# Patient Record
Sex: FEMALE | Race: WHITE | Employment: UNEMPLOYED | URBAN - METROPOLITAN AREA
[De-identification: names, ages, dates, MRNs, and addresses within clinical notes are randomized per-mention and may not be internally consistent; named-entity substitution may affect disease eponyms.]

---

## 2020-10-21 PROBLEM — R29.818 SUSPECTED SLEEP APNEA: Status: ACTIVE | Noted: 2020-10-21

## 2020-10-21 PROBLEM — G47.10 HYPERSOMNIA: Status: ACTIVE | Noted: 2020-10-21

## 2020-10-21 PROBLEM — G47.8 NON-RESTORATIVE SLEEP: Status: ACTIVE | Noted: 2020-10-21

## 2021-02-18 ENCOUNTER — HOSPITAL ENCOUNTER (OUTPATIENT)
Dept: SLEEP MEDICINE | Age: 56
Discharge: HOME | End: 2021-02-18

## 2021-02-18 PROCEDURE — 95806 SLEEP STUDY UNATT&RESP EFFT: CPT

## 2021-03-11 PROBLEM — G47.33 OSA (OBSTRUCTIVE SLEEP APNEA): Status: ACTIVE | Noted: 2020-10-21

## 2022-03-18 PROBLEM — G47.33 OSA (OBSTRUCTIVE SLEEP APNEA): Status: ACTIVE | Noted: 2020-10-21

## 2022-03-19 PROBLEM — G47.10 HYPERSOMNIA: Status: ACTIVE | Noted: 2020-10-21

## 2022-03-19 PROBLEM — G47.8 NON-RESTORATIVE SLEEP: Status: ACTIVE | Noted: 2020-10-21

## 2023-01-06 ENCOUNTER — HOSPITAL ENCOUNTER (EMERGENCY)
Dept: GENERAL RADIOLOGY | Age: 58
End: 2023-01-06

## 2023-01-06 ENCOUNTER — APPOINTMENT (OUTPATIENT)
Dept: GENERAL RADIOLOGY | Age: 58
End: 2023-01-06

## 2023-01-06 ENCOUNTER — HOSPITAL ENCOUNTER (EMERGENCY)
Age: 58
Discharge: HOME | End: 2023-01-07
Attending: EMERGENCY MEDICINE

## 2023-01-06 DIAGNOSIS — L03.116 CELLULITIS OF LEFT LOWER EXTREMITY: Primary | ICD-10-CM

## 2023-01-06 LAB
ALBUMIN SERPL-MCNC: 3.1 G/DL (ref 3.2–4.6)
ALBUMIN/GLOB SERPL: 0.7 (ref 0.4–1.6)
ALP SERPL-CCNC: 73 U/L (ref 50–130)
ALT SERPL-CCNC: 31 U/L (ref 12–65)
ANION GAP SERPL CALC-SCNC: 5 MMOL/L (ref 2–11)
AST SERPL-CCNC: 33 U/L (ref 15–37)
BASOPHILS # BLD: 0.1 K/UL (ref 0–0.2)
BASOPHILS NFR BLD: 1 % (ref 0–2)
BILIRUB SERPL-MCNC: 0.3 MG/DL (ref 0.2–1.1)
BUN SERPL-MCNC: 12 MG/DL (ref 8–23)
CALCIUM SERPL-MCNC: 9.6 MG/DL (ref 8.3–10.4)
CHLORIDE SERPL-SCNC: 102 MMOL/L (ref 101–110)
CO2 SERPL-SCNC: 29 MMOL/L (ref 21–32)
CREAT SERPL-MCNC: 0.75 MG/DL (ref 0.6–1)
DIFFERENTIAL METHOD BLD: ABNORMAL
EOSINOPHIL # BLD: 0.2 K/UL (ref 0–0.8)
EOSINOPHIL NFR BLD: 3 % (ref 0.5–7.8)
ERYTHROCYTE [DISTWIDTH] IN BLOOD BY AUTOMATED COUNT: 12.7 % (ref 11.9–14.6)
GLOBULIN SER CALC-MCNC: 4.5 G/DL (ref 2.8–4.5)
GLUCOSE SERPL-MCNC: 137 MG/DL (ref 65–100)
HCT VFR BLD AUTO: 34.8 % (ref 35.8–46.3)
HGB BLD-MCNC: 11.3 G/DL (ref 11.7–15.4)
IMM GRANULOCYTES # BLD AUTO: 0 K/UL (ref 0–0.5)
IMM GRANULOCYTES NFR BLD AUTO: 0 % (ref 0–5)
LACTATE SERPL-SCNC: 1.4 MMOL/L (ref 0.4–2)
LYMPHOCYTES # BLD: 2.6 K/UL (ref 0.5–4.6)
LYMPHOCYTES NFR BLD: 30 % (ref 13–44)
MCH RBC QN AUTO: 28.3 PG (ref 26.1–32.9)
MCHC RBC AUTO-ENTMCNC: 32.5 G/DL (ref 31.4–35)
MCV RBC AUTO: 87.2 FL (ref 82–102)
MONOCYTES # BLD: 0.6 K/UL (ref 0.1–1.3)
MONOCYTES NFR BLD: 7 % (ref 4–12)
NEUTS SEG # BLD: 5.1 K/UL (ref 1.7–8.2)
NEUTS SEG NFR BLD: 59 % (ref 43–78)
NRBC # BLD: 0 K/UL (ref 0–0.2)
PLATELET # BLD AUTO: 387 K/UL (ref 150–450)
PMV BLD AUTO: 8.4 FL (ref 9.4–12.3)
POTASSIUM SERPL-SCNC: 3.4 MMOL/L (ref 3.5–5.1)
PROCALCITONIN SERPL-MCNC: <0.05 NG/ML (ref 0–0.49)
PROT SERPL-MCNC: 7.6 G/DL (ref 6.3–8.2)
RBC # BLD AUTO: 3.99 M/UL (ref 4.05–5.2)
SODIUM SERPL-SCNC: 136 MMOL/L (ref 133–143)
WBC # BLD AUTO: 8.7 K/UL (ref 4.3–11.1)

## 2023-01-06 PROCEDURE — 87040 BLOOD CULTURE FOR BACTERIA: CPT

## 2023-01-06 PROCEDURE — 93005 ELECTROCARDIOGRAM TRACING: CPT | Performed by: EMERGENCY MEDICINE

## 2023-01-06 PROCEDURE — 83605 ASSAY OF LACTIC ACID: CPT

## 2023-01-06 PROCEDURE — 85025 COMPLETE CBC W/AUTO DIFF WBC: CPT

## 2023-01-06 PROCEDURE — 99285 EMERGENCY DEPT VISIT HI MDM: CPT

## 2023-01-06 PROCEDURE — 71045 X-RAY EXAM CHEST 1 VIEW: CPT

## 2023-01-06 PROCEDURE — 80053 COMPREHEN METABOLIC PANEL: CPT

## 2023-01-06 PROCEDURE — 84145 PROCALCITONIN (PCT): CPT

## 2023-01-06 ASSESSMENT — PAIN DESCRIPTION - LOCATION: LOCATION: LEG

## 2023-01-06 ASSESSMENT — PAIN DESCRIPTION - ONSET: ONSET: ON-GOING

## 2023-01-06 ASSESSMENT — PAIN - FUNCTIONAL ASSESSMENT: PAIN_FUNCTIONAL_ASSESSMENT: 0-10

## 2023-01-06 ASSESSMENT — PAIN DESCRIPTION - FREQUENCY: FREQUENCY: CONTINUOUS

## 2023-01-06 ASSESSMENT — LIFESTYLE VARIABLES
HOW MANY STANDARD DRINKS CONTAINING ALCOHOL DO YOU HAVE ON A TYPICAL DAY: PATIENT DOES NOT DRINK
HOW OFTEN DO YOU HAVE A DRINK CONTAINING ALCOHOL: NEVER

## 2023-01-06 ASSESSMENT — PAIN DESCRIPTION - PAIN TYPE: TYPE: ACUTE PAIN

## 2023-01-06 ASSESSMENT — PAIN SCALES - GENERAL: PAINLEVEL_OUTOF10: 10

## 2023-01-06 ASSESSMENT — PAIN DESCRIPTION - ORIENTATION: ORIENTATION: LEFT

## 2023-01-07 ENCOUNTER — HOSPITAL ENCOUNTER (EMERGENCY)
Dept: GENERAL RADIOLOGY | Age: 58
End: 2023-01-07

## 2023-01-07 ENCOUNTER — HOSPITAL ENCOUNTER (EMERGENCY)
Dept: ULTRASOUND IMAGING | Age: 58
End: 2023-01-07

## 2023-01-07 VITALS
RESPIRATION RATE: 20 BRPM | WEIGHT: 108 LBS | HEART RATE: 97 BPM | HEIGHT: 63 IN | SYSTOLIC BLOOD PRESSURE: 111 MMHG | DIASTOLIC BLOOD PRESSURE: 56 MMHG | BODY MASS INDEX: 19.14 KG/M2 | TEMPERATURE: 98.8 F | OXYGEN SATURATION: 98 %

## 2023-01-07 LAB
EKG ATRIAL RATE: 93 BPM
EKG DIAGNOSIS: NORMAL
EKG P AXIS: 72 DEGREES
EKG P-R INTERVAL: 120 MS
EKG Q-T INTERVAL: 354 MS
EKG QRS DURATION: 78 MS
EKG QTC CALCULATION (BAZETT): 440 MS
EKG R AXIS: 50 DEGREES
EKG T AXIS: 56 DEGREES
EKG VENTRICULAR RATE: 93 BPM

## 2023-01-07 PROCEDURE — 6370000000 HC RX 637 (ALT 250 FOR IP): Performed by: EMERGENCY MEDICINE

## 2023-01-07 PROCEDURE — 73590 X-RAY EXAM OF LOWER LEG: CPT

## 2023-01-07 PROCEDURE — 93971 EXTREMITY STUDY: CPT

## 2023-01-07 RX ORDER — AMOXICILLIN AND CLAVULANATE POTASSIUM 875; 125 MG/1; MG/1
1 TABLET, FILM COATED ORAL 2 TIMES DAILY
Qty: 10 TABLET | Refills: 0 | Status: SHIPPED | OUTPATIENT
Start: 2023-01-07 | End: 2023-01-12

## 2023-01-07 RX ORDER — HYDROCODONE BITARTRATE AND ACETAMINOPHEN 7.5; 325 MG/1; MG/1
1 TABLET ORAL
Status: COMPLETED | OUTPATIENT
Start: 2023-01-07 | End: 2023-01-07

## 2023-01-07 RX ADMIN — HYDROCODONE BITARTRATE AND ACETAMINOPHEN 1 TABLET: 7.5; 325 TABLET ORAL at 03:09

## 2023-01-07 ASSESSMENT — ENCOUNTER SYMPTOMS
DIARRHEA: 0
ABDOMINAL PAIN: 0
VOMITING: 0
SHORTNESS OF BREATH: 0
FACIAL SWELLING: 0

## 2023-01-07 ASSESSMENT — PAIN SCALES - GENERAL: PAINLEVEL_OUTOF10: 10

## 2023-01-07 NOTE — ED NOTES
I have reviewed discharge instructions with the patient. The patient verbalized understanding. Patient left ED via Discharge Method: wheelchair to Home with family. Opportunity for questions and clarification provided. Patient given 1 scripts. To continue your aftercare when you leave the hospital, you may receive an automated call from our care team to check in on how you are doing. This is a free service and part of our promise to provide the best care and service to meet your aftercare needs.  If you have questions, or wish to unsubscribe from this service please call [phone]. Thank you for Choosing our [facility] Emergency Department.       David Hernández RN  01/07/23 5437

## 2023-01-07 NOTE — ED PROVIDER NOTES
Emergency Department Provider Note                   PCP:                None None               Age: 59 y.o. Sex: female       ICD-10-CM    1. Cellulitis of left lower extremity  L03. [address] To Discharge 01/07/2023 03:08:24 AM       Medical Decision Making  Wound only mildly erythematous around the wound VAC. No significant cellulitis to require admission. Labs unremarkable. No DVT or signs of osteomyelitis. Advised to follow-up with surgeon as soon as possible for wound check. We will give an additional 5 days of Augmentin. Amount and/or Complexity of Data Reviewed  External Data Reviewed: labs, radiology and notes. Labs: ordered. Radiology: ordered and independent interpretation performed. ECG/medicine tests: ordered and independent interpretation performed. Risk  Prescription drug management. Orders Placed This Encounter   Procedures    Culture, Blood 1    Culture, Blood 2    XR CHEST PORTABLE    XR TIBIA FIBULA LEFT (2 VIEWS)    Lactate, Sepsis    CBC with Auto Differential    CMP    Procalcitonin    Cardiac Monitor - ED Only    Straight Cath (Select if patient is unable to provide a sample)    POCT Urine Dipstick    EKG 12 Lead    Saline lock IV    Vascular duplex lower extremity venous left        Medications   HYDROcodone-acetaminophen (NORCO) 7.5-325 MG per tablet 1 tablet (1 tablet Oral Given 1/7/23 0309)       New Prescriptions    AMOXICILLIN-CLAVULANATE (AUGMENTIN) 875-125 MG PER TABLET    Take 1 tablet by mouth 2 times daily for 5 days        Tevin Marcus is a 59 y.o. female who presents to the Emergency Department with chief complaint of    Chief Complaint   Patient presents with    Post-op Problem      Patient presents with increased swelling to her left leg with redness around her wound VAC from a dog bite requiring 2 debridements. She was admitted to [facility] January 2 through 4.   She has been managed by home health. Home health nurse was concerned about the increased redness and swelling tonight so sent her to the emergency department. Her last dose of Augmentin is today. Also reports subjective fevers. Discharge Summary  Trauma Surgery 2023     Basilio Lebron :1965 [id number] Sharri Crowe MD     Discharge time: 35 minutes    Date of Admission: 23 Date of Discharge: 23    Primary Diagnoses:   Multiple dog bite wounds     Procedures During Admission:  Left lower extremity wound washout, packing 23 Dr. Nikos Samaniego   Left lower extremity debridement, wound vac placement 1/3/23 Dr. Katja Leiva     HPI: per H&P   Ms David Segundo presented to [facility] ED via EMS with complaints of multiple dog bites. Patient states she heard something in yard, and went outside to investigate. She states the neighbor's pitbull attacked her. She states she fell on to her knees and then fell on her buttock. Denies falling and hitting her head or any loc. Pain is located in her lower extremities at sites of bites. Denies any chest pain, shortness of breath, abdominal pain, nausea or vomiting. She denies any bleeding disorders. States she does have many medical problems but does not take any medications regularly or any blood thinners. She states the dogs have been an issue in the past, requiring animal control investigation. She believes the dogs have been vaccinated for Rabies and that the dogs are currently dead. Summary:  Patient taken to the operating room urgently for washout of his extensive bilateral lower extremity wounds. Dr. Nikos Samaniego washed out wounds and packed with kerlix. She was admitted post operatively and taken to the OR the following day for planned repeat washout of the wounds with placement of wound vac to leg leg. Home wound vac has been arranged and she will be discharged with home health nursing for wound vac changes and wet to dry dressing changes to right leg wounds.  Upon arrival, patient was started on Augmentin and she will be discharged to complete 5 day course of augmentin. Patient remains afebrile with stable vital signs. She is stable for discharge home today. Consults:  CONSULT TO CM/SHASHANK FOR DISCHARGE PLANNING  WOUND OSTOMY EVAL AND TREAT  CONSULT TO CM/SHASHANK FOR DISCHARGE PLANNING    Follow Up Appointments:  No future appointments. Trauma office 2 weeks     Prescriptions Provided:  See below     DISCHARGE EXAM     Temperature: 97.2 °F (36.2 °C) [97.2 °F (36.2 °C)-98.5 °F (36.9 °C)]   Heart Rate: 86 [78-90]   Resp Rate: 18 [11-21]   Blood Pressure: 102/67 ()/(56-68)   Oxygen Sats: 92 % [92 %-100 %]     Current Weight: 52.2 kg (115 lb)     Discharge Diet Regular    Physical Exam: performed by Dr. Amanda Ledbetter on day of discharge 1/4/23   CONST No acute distress and Resting comfortably, tearful   NEURO alert & oriented x3   CV Regular rate and rhythm   RESP normal work of breathing , no stridor   GI Soft, Non distended   Incision Wound vac in place, subq exposed on right but healthy appearing     Recent Labs   01/02/23  1245 01/03/23  1032 01/04/23  0505   WBC 14.9* 10.8 8.5   HGB 12.6 10.4* 9.4*   HCT 38.9 31.5* 28.7*    315 288   INR 1.0 -- --   PTT 26 -- --       Recent Labs   01/02/23  1245 01/03/23  1032 01/04/23  0505    -- --   K 3.4* -- --    -- --   CO2 23 -- --   BUN 12 -- --   CREATININE 0.80 -- --   GLUCOSE 148* -- --   CALCIUM 8.8 -- --   MG 2.0 1.8 1.8   PHOS 3.2 2.9 3.2       Discharge Condition: stable    Visit Diagnosis/Procedures    Active Problems:  Dog bite of calf, left, initial encounter  Dog bite of left lower leg           Review of Systems   Constitutional:  Positive for fever. HENT:  Negative for facial swelling. Eyes:  Negative for visual disturbance. Respiratory:  Negative for shortness of breath. Cardiovascular:  Positive for leg swelling. Negative for chest pain.    Gastrointestinal:  Negative for abdominal pain, diarrhea and vomiting. Musculoskeletal:  Positive for arthralgias and myalgias. Negative for joint swelling. Skin:  Positive for wound. Negative for rash. Neurological:  Negative for speech difficulty. Psychiatric/Behavioral:  Negative for confusion. All other systems reviewed and are negative. Past Medical History:   Diagnosis Date    Allergic rhinitis     Arthritis     chronic back pain    Chronic pain     Fibromyalgia, arthritis    Depression     Dyslipidemia     GERD (gastroesophageal reflux disease)     Interstitial cystitis     Sleep disorder     idiopathic hypersomnia        Past Surgical History:   Procedure Laterality Date    BREAST SURGERY  2002    reduction    CHOLECYSTECTOMY, LAPAROSCOPIC  1997    GYN      cryo (froze cervix)    REFRACTIVE SURGERY  2002    SHOULDER ARTHROSCOPY Right 2005    rotator cuff        Family History   Problem Relation Age of Onset    Cancer Father         throat cancer, skin cancer    Cancer Mother         breast    Heart Disease Mother         cardiomyopathy        Social History     Socioeconomic History    Marital status:      Spouse name: None    Number of children: None    Years of education: None    Highest education level: None   Tobacco Use    Smoking status: Every Day     Packs/day: 0.50     Types: Cigarettes    Smokeless tobacco: Never   Substance and Sexual Activity    Alcohol use: No    Drug use: No        Allergies: Sulfa antibiotics and Aspirin    Previous Medications    AMPHETAMINE-DEXTROAMPHETAMINE (ADDERALL, 30MG,) 30 MG TABLET    Take 30 mg by mouth 3 times daily. DIAZEPAM (VALIUM) 5 MG TABLET    Take 5 mg by mouth every 6 hours as needed. ESCITALOPRAM (LEXAPRO) 20 MG TABLET    Take 20 mg by mouth daily    FEXOFENADINE (ALLEGRA ALLERGY) 180 MG TABLET    Take 180 mg by mouth daily    HYDROCODONE-ACETAMINOPHEN (NORCO) 7.5-325 MG PER TABLET    Take 1 tablet by mouth every 8 hours as needed.     LANSOPRAZOLE (PREVACID) 30 MG DELAYED RELEASE CAPSULE    Take 20 mg by mouth every morning (before breakfast)    LEVOCETIRIZINE (XYZAL) 5 MG TABLET    Take 5 mg by mouth daily    OMEPRAZOLE (PRILOSEC OTC) 20 MG TABLET    Take 20 mg by mouth daily    SOLRIAMFETOL HCL (SUNOSI) 75 MG TABS    Take 75 mg by mouth daily    TRAMADOL (ULTRAM) 50 MG TABLET    Take 50 mg by mouth every 6 hours as needed. VITAMIN E 400 UNITS TABS    Take 400 mg by mouth        Vitals signs and nursing note reviewed. No data found. Physical Exam  Vitals and nursing note reviewed. Constitutional:       Appearance: Normal appearance. She is well-developed. HENT:      Head: Normocephalic and atraumatic. Nose: Nose normal.      Mouth/Throat:      Mouth: Mucous membranes are moist.   Eyes:      Extraocular Movements: Extraocular movements intact. Pupils: Pupils are equal, round, and reactive to light. Cardiovascular:      Rate and Rhythm: Regular rhythm. Tachycardia present. Pulmonary:      Effort: Pulmonary effort is normal. No respiratory distress. Abdominal:      General: Abdomen is flat. There is no distension. Musculoskeletal:         General: Swelling, tenderness and signs of injury present. Normal range of motion. Cervical back: Neck supple. Skin:     General: Skin is warm and dry. Findings: Laceration present. Comments: Multiple superficial lacerations with wound VAC covering left calf. Slight surrounding erythematous streaks. Edema right leg extending into foot with palpable pulses   Neurological:      General: No focal deficit present. Mental Status: She is alert. Mental status is at baseline.    Psychiatric:         Mood and Affect: Mood normal.              Procedures    ED EKG Interpretation  EKG was interpreted in the absence of a cardiologist.    Rate: 93  EKG Interpretation: EKG Interpretation: sinus rhythm  ST Segments: Nonspecific ST segments - NO STEMI    Results for orders placed or performed during the hospital encounter of 01/06/23   XR CHEST PORTABLE    Narrative    Portable chest xray      COMPARISON: April 2009    INDICATION: Sepsis    FINDINGS:     There is no focal pulmonary consolidation, pleural effusion or pneumothorax. No  pulmonary edema. Cardiac mediastinal silhouette is within normal limits. Surrounding bones are intact. Impression    1. No radiographic evidence of pneumonia or pulmonary edema. XR TIBIA FIBULA LEFT (2 VIEWS)    Narrative    Clinical history: Increased swelling. Dog bite and debridement with wound VAC    TECHNIQUE: 2 views of the left tibia/fibular. FINDINGS:    Tibia and fibula are intact. Alignment is maintained. There is no acute fracture  or dislocation. No erosive or destructive bone lesion. A wound vac device identified. Impression    1. No acute bone abnormality or radiographic evidence of osteomyelitis.    Lactate, Sepsis   Result Value Ref Range    Lactic Acid, Sepsis 1.4 0.4 - 2.0 MMOL/L   CBC with Auto Differential   Result Value Ref Range    WBC 8.7 4.3 - 11.1 K/uL    RBC 3.99 (L) 4.05 - 5.2 M/uL    Hemoglobin 11.3 (L) 11.7 - 15.4 g/dL    Hematocrit 34.8 (L) 35.8 - 46.3 %    MCV 87.2 82.0 - 102.0 FL    MCH 28.3 26.1 - 32.9 PG    MCHC 32.5 31.4 - 35.0 g/dL    RDW 12.7 11.9 - 14.6 %    Platelets 966 621 - 084 K/uL    MPV 8.4 (L) 9.4 - 12.3 FL    nRBC 0.00 0.0 - 0.2 K/uL    Differential Type AUTOMATED      Seg Neutrophils 59 43 - 78 %    Lymphocytes 30 13 - 44 %    Monocytes 7 4.0 - 12.0 %    Eosinophils % 3 0.5 - 7.8 %    Basophils 1 0.0 - 2.0 %    Immature Granulocytes 0 0.0 - 5.0 %    Segs Absolute 5.1 1.7 - 8.2 K/UL    Absolute Lymph # 2.6 0.5 - 4.6 K/UL    Absolute Mono # 0.6 0.1 - 1.3 K/UL    Absolute Eos # 0.2 0.0 - 0.8 K/UL    Basophils Absolute 0.1 0.0 - 0.2 K/UL    Absolute Immature Granulocyte 0.0 0.0 - 0.5 K/UL   CMP   Result Value Ref Range    Sodium 136 133 - 143 mmol/L    Potassium 3.4 (L) 3.5 - 5.1 mmol/L    Chloride 102 101 - 110 mmol/L CO2 29 21 - 32 mmol/L    Anion Gap 5 2 - 11 mmol/L    Glucose 137 (H) 65 - 100 mg/dL    BUN 12 8 - 23 MG/DL    Creatinine 0.75 0.6 - 1.0 MG/DL    Est, Glom Filt Rate >60 >60 ml/min/1.73m2    Calcium 9.6 8.3 - 10.4 MG/DL    Total Bilirubin 0.3 0.2 - 1.1 MG/DL    ALT 31 12 - 65 U/L    AST 33 15 - 37 U/L    Alk Phosphatase 73 50 - 130 U/L    Total Protein 7.6 6.3 - 8.2 g/dL    Albumin 3.1 (L) 3.2 - 4.6 g/dL    Globulin 4.5 2.8 - 4.5 g/dL    Albumin/Globulin Ratio 0.7 0.4 - 1.6     Procalcitonin   Result Value Ref Range    Procalcitonin <0.05 0.00 - 0.49 ng/mL   EKG 12 Lead   Result Value Ref Range    Ventricular Rate 93 BPM    Atrial Rate 93 BPM    P-R Interval 120 ms    QRS Duration 78 ms    Q-T Interval 354 ms    QTc Calculation (Bazett) 440 ms    P Axis 72 degrees    R Axis 50 degrees    T Axis 56 degrees    Diagnosis Normal sinus rhythm    Vascular duplex lower extremity venous left    Narrative    Clinical history: Leg pain, post surgery. TECHNIQUE: Grayscale and color Doppler venous ultrasound of the left lower  extremity    FINDINGS:    The common femoral, femoral, popliteal, posterior tibial and peroneal veins are  patent and compressible. There is color Doppler flow. No evidence of DVT. Impression    1. No evidence of deep vein thrombosis. Vascular duplex lower extremity venous left   Final Result      1. No evidence of deep vein thrombosis. XR TIBIA FIBULA LEFT (2 VIEWS)   Final Result      1. No acute bone abnormality or radiographic evidence of osteomyelitis. XR CHEST PORTABLE   Final Result      1. No radiographic evidence of pneumonia or pulmonary edema. Voice dictation software was used during the making of this note. This software is not perfect and grammatical and other typographical errors may be present. This note has not been completely proofread for errors.      Arlette Phillips MD  01/07/23 2001

## 2023-01-07 NOTE — DISCHARGE INSTRUCTIONS
Follow-up with your surgeon as soon as possible for wound check. Take additional 5 days of Augmentin. Return for worsening or concerning symptoms.

## 2023-01-07 NOTE — ED TRIAGE NOTES
Pt arrives A&ox4 in wheelchair. Pt states she was attacked by pitbulls Monday and had 2 surgeries done. Pt recommended by home health nurse to be seen in ER for redness around wound site. Pulses present distal to site. Swelling noted in foot. Pt states she is having hematuria.

## 2023-01-08 LAB
BACTERIA SPEC CULT: NORMAL
SERVICE CMNT-IMP: NORMAL

## 2023-01-11 LAB
BACTERIA SPEC CULT: NORMAL
SERVICE CMNT-IMP: NORMAL

## 2024-06-14 ENCOUNTER — TELEPHONE (OUTPATIENT)
Dept: ORTHOPEDIC SURGERY | Age: 59
End: 2024-06-14

## 2024-06-14 NOTE — TELEPHONE ENCOUNTER
Pt wants 2nd opinion of right shoulder. Pt will send records for Dr. Wheat to review. Pt would be self pay

## 2025-01-07 ENCOUNTER — TELEPHONE (OUTPATIENT)
Dept: ORTHOPEDIC SURGERY | Age: 60
End: 2025-01-07

## 2025-01-07 NOTE — TELEPHONE ENCOUNTER
I  went  ahead and scheduled this pt an appt    She  had  a right  shoulder sx in 2007  w/ Posta and  now has  been  told that she  needs  a  total shoulder and is requesting to see [facility]  again   She  saw  Dr Leiva  2 x in  2024 and not  since  April  no new  films.  I  went  ahead and  made her an appt.    Is  this ok?  She  was  trying to have those records  sent to  us  since  June and they have  never made it.

## 2025-02-05 ENCOUNTER — OFFICE VISIT (OUTPATIENT)
Dept: ORTHOPEDIC SURGERY | Age: 60
End: 2025-02-05
Payer: COMMERCIAL

## 2025-02-05 VITALS — WEIGHT: 108 LBS | HEIGHT: 63 IN | BODY MASS INDEX: 19.14 KG/M2

## 2025-02-05 DIAGNOSIS — M75.101 ROTATOR CUFF TEAR ARTHROPATHY OF RIGHT SHOULDER: Primary | ICD-10-CM

## 2025-02-05 DIAGNOSIS — M12.811 ROTATOR CUFF TEAR ARTHROPATHY OF RIGHT SHOULDER: Primary | ICD-10-CM

## 2025-02-05 DIAGNOSIS — M19.011 DEGENERATIVE JOINT DISEASE OF RIGHT ACROMIOCLAVICULAR JOINT: ICD-10-CM

## 2025-02-05 DIAGNOSIS — M75.21 BICIPITAL TENDINITIS OF RIGHT SHOULDER: ICD-10-CM

## 2025-02-05 PROCEDURE — 99205 OFFICE O/P NEW HI 60 MIN: CPT | Performed by: ORTHOPAEDIC SURGERY

## 2025-02-05 RX ORDER — MELOXICAM 15 MG/1
15 TABLET ORAL DAILY
Qty: 30 TABLET | Refills: 0 | Status: SHIPPED | OUTPATIENT
Start: 2025-02-05 | End: 2025-03-07

## 2025-02-05 RX ORDER — PREDNISONE 10 MG/1
TABLET ORAL
COMMUNITY
Start: 2024-02-12

## 2025-02-05 RX ORDER — IBUPROFEN 600 MG/1
600 TABLET, FILM COATED ORAL EVERY 6 HOURS PRN
COMMUNITY

## 2025-02-05 RX ORDER — MELOXICAM 15 MG/1
15 TABLET ORAL DAILY
COMMUNITY
Start: 2024-04-09

## 2025-02-05 NOTE — PROGRESS NOTES
Name: Gracie Farrell  YOB: 1965  Gender: female  MRN: [id number]      What: Right shoulder pain  How: Complicated history but related to a dog attack 2023    Self-referred second opinion    HPI: Gracie Farrell is a 60 y.o. right-hand-dominant female seen for a self-referred second opinion for right shoulder problems.  She has a history of hypercholesterolemia, nicotine addiction, cervical spondylosis.  Apparently I performed an arthroscopic rotator cuff repair on her right shoulder in 2007.  She did well until 2023 when she was attacked by 2 of her neighbors pit bulls.  She suffered multiple dog bites.  This aggravated her right shoulder.  She was treated at [facility] because she was uninsured at the time.  She has had multiple right shoulder injections.  The injections do not work.  She complains of right shoulder pain difficulty elevating her right arm and inability to go behind the back and difficulty sleeping.  She does have tingling into both hands.  She was followed at the [facility] clinic.  Initially they wanted her to stop smoking for 2 weeks and then 4 months.  She is getting frustrated      ROS/Meds/PSH/PMH/FH/SH: A ten system review of systems was performed and is negative other than what is in the HPI.   Tobacco:  reports that she has been smoking cigarettes. She started smoking about 51 years ago. She has a 25.5 pack-year smoking history. She has never used smokeless tobacco.  Ht 1.6 m (5' 3\")   Wt 49 kg (108 lb)   BMI 19.13 kg/m²      Physical Examination:  She is an awake alert pleasant female ambulating without difficulty  She has restricted range of cervical spine motion without radicular findings    The left shoulder has 0 to 180 degrees of active and 0 to 180 degrees passive forward elevation.   Internal rotation is to T6.  External rotation is to 60 degrees at the side.   In the 90 degree abducted position 90 degrees of external and 90 degrees internal

## 2025-03-06 ENCOUNTER — TELEPHONE (OUTPATIENT)
Dept: ORTHOPEDIC SURGERY | Age: 60
End: 2025-03-06

## 2025-03-06 ENCOUNTER — HOSPITAL ENCOUNTER (OUTPATIENT)
Dept: SURGERY | Age: 60
Discharge: HOME | End: 2025-03-09
Payer: COMMERCIAL

## 2025-03-06 VITALS
HEIGHT: 63 IN | RESPIRATION RATE: 16 BRPM | WEIGHT: 106.6 LBS | DIASTOLIC BLOOD PRESSURE: 82 MMHG | OXYGEN SATURATION: 96 % | TEMPERATURE: 98.1 F | SYSTOLIC BLOOD PRESSURE: 127 MMHG | BODY MASS INDEX: 18.89 KG/M2 | HEART RATE: 85 BPM

## 2025-03-06 LAB
ALBUMIN SERPL-MCNC: 3.4 G/DL (ref 3.2–4.6)
ALBUMIN/GLOB SERPL: 0.8 (ref 1–1.9)
ALP SERPL-CCNC: 85 U/L (ref 35–104)
ALT SERPL-CCNC: <5 U/L (ref 8–45)
ANION GAP SERPL CALC-SCNC: 13 MMOL/L (ref 7–16)
APPEARANCE UR: CLEAR
APTT PPP: 26.5 SEC (ref 23.3–37.4)
AST SERPL-CCNC: 14 U/L (ref 15–37)
BACTERIA URNS QL MICRO: NEGATIVE /HPF
BILIRUB SERPL-MCNC: 0.3 MG/DL (ref 0–1.2)
BILIRUB UR QL: NEGATIVE
BUN SERPL-MCNC: 11 MG/DL (ref 8–23)
CALCIUM SERPL-MCNC: 9.5 MG/DL (ref 8.8–10.2)
CHLORIDE SERPL-SCNC: 99 MMOL/L (ref 98–107)
CO2 SERPL-SCNC: 26 MMOL/L (ref 20–29)
COLOR UR: ABNORMAL
CREAT SERPL-MCNC: 0.75 MG/DL (ref 0.6–1.1)
EPI CELLS #/AREA URNS HPF: ABNORMAL /HPF
ERYTHROCYTE [DISTWIDTH] IN BLOOD BY AUTOMATED COUNT: 13.7 % (ref 11.9–14.6)
GLOBULIN SER CALC-MCNC: 4.2 G/DL (ref 2.3–3.5)
GLUCOSE SERPL-MCNC: 79 MG/DL (ref 70–99)
GLUCOSE UR STRIP.AUTO-MCNC: NEGATIVE MG/DL
HCT VFR BLD AUTO: 41 % (ref 35.8–46.3)
HGB BLD-MCNC: 13.7 G/DL (ref 11.7–15.4)
HGB UR QL STRIP: NEGATIVE
HYALINE CASTS URNS QL MICRO: ABNORMAL /LPF
INR PPP: 1
KETONES UR QL STRIP.AUTO: ABNORMAL MG/DL
LEUKOCYTE ESTERASE UR QL STRIP.AUTO: NEGATIVE
MAGNESIUM SERPL-MCNC: 2 MG/DL (ref 1.8–2.4)
MCH RBC QN AUTO: 27.7 PG (ref 26.1–32.9)
MCHC RBC AUTO-ENTMCNC: 33.4 G/DL (ref 31.4–35)
MCV RBC AUTO: 82.8 FL (ref 82–102)
NITRITE UR QL STRIP.AUTO: NEGATIVE
NRBC # BLD: 0 K/UL (ref 0–0.2)
PH UR STRIP: 5.5 (ref 5–9)
PLATELET # BLD AUTO: 298 K/UL (ref 150–450)
PMV BLD AUTO: 8.4 FL (ref 9.4–12.3)
POTASSIUM SERPL-SCNC: 3.9 MMOL/L (ref 3.5–5.1)
PROT SERPL-MCNC: 7.6 G/DL (ref 6.3–8.2)
PROT UR STRIP-MCNC: 30 MG/DL
PROTHROMBIN TIME: 13.5 SEC (ref 11.3–14.9)
RBC # BLD AUTO: 4.95 M/UL (ref 4.05–5.2)
RBC #/AREA URNS HPF: ABNORMAL /HPF
SODIUM SERPL-SCNC: 138 MMOL/L (ref 136–145)
SP GR UR REFRACTOMETRY: 1.02 (ref 1–1.02)
UROBILINOGEN UR QL STRIP.AUTO: 0.2 EU/DL (ref 0.2–1)
WBC # BLD AUTO: 6 K/UL (ref 4.3–11.1)
WBC URNS QL MICRO: ABNORMAL /HPF

## 2025-03-06 PROCEDURE — 87641 MR-STAPH DNA AMP PROBE: CPT

## 2025-03-06 PROCEDURE — 80053 COMPREHEN METABOLIC PANEL: CPT

## 2025-03-06 PROCEDURE — 85027 COMPLETE CBC AUTOMATED: CPT

## 2025-03-06 PROCEDURE — 83735 ASSAY OF MAGNESIUM: CPT

## 2025-03-06 PROCEDURE — 85730 THROMBOPLASTIN TIME PARTIAL: CPT

## 2025-03-06 PROCEDURE — 81001 URINALYSIS AUTO W/SCOPE: CPT

## 2025-03-06 PROCEDURE — 85610 PROTHROMBIN TIME: CPT

## 2025-03-06 RX ORDER — MELOXICAM 15 MG/1
15 TABLET ORAL DAILY
Qty: 30 TABLET | Refills: 0 | Status: SHIPPED | OUTPATIENT
Start: 2025-03-06 | End: 2025-04-05

## 2025-03-06 ASSESSMENT — PAIN SCALES - GENERAL: PAINLEVEL_OUTOF10: 5

## 2025-03-06 ASSESSMENT — PAIN DESCRIPTION - ORIENTATION: ORIENTATION: RIGHT

## 2025-03-06 ASSESSMENT — PAIN - FUNCTIONAL ASSESSMENT: PAIN_FUNCTIONAL_ASSESSMENT: PREVENTS OR INTERFERES SOME ACTIVE ACTIVITIES AND ADLS

## 2025-03-06 ASSESSMENT — PAIN DESCRIPTION - DESCRIPTORS: DESCRIPTORS: ACHING

## 2025-03-06 ASSESSMENT — PAIN DESCRIPTION - LOCATION: LOCATION: SHOULDER

## 2025-03-06 NOTE — PERIOP NOTE
PLEASE CONTINUE TAKING ALL PRESCRIPTION MEDICATIONS UP TO THE DAY OF SURGERY UNLESS OTHERWISE DIRECTED BELOW.    DISCONTINUE all vitamins, herbals and supplements 1 week prior to surgery. DISCONTINUE Non-Steroidal Anti-Inflammatory (NSAIDS) such as Advil, Ibuprofen, Motrin, Naproxen and Aleve 5 days prior to surgery.       Home Medications to take  the day of surgery    Omeprazole, hydrocodone if needed            Home Medications to Hold- please continue all other medications except these.    Stop meloxicam five days prior to surgery    Stop vitamin E one week prior to surgery      Comments        Bring Dynahex wash and Incentive Spirometer with you to hospital on the day of surgery.            Please do not bring home medications with you on the day of surgery unless otherwise directed by your nurse.  If you are instructed to bring home medications, please give them to your nurse as they will be administered by the nursing staff.    If you have any questions, please call [facility] [phone] option [phone].    A copy of this note was provided to the patient for reference.

## 2025-03-06 NOTE — PERIOP NOTE
Patient verified name and .    Order for consent was not found in EHR.    Type 3 surgery, PAT walk in assessment complete.    Labs per surgeon: no orders received.   Labs per anesthesia protocol: CBC,CMP, PT, PTT, UA, Mag; results pending.  EKG: none needed per anesthesia protocol.    MRSA/MSSA swab collected per policy. MD to consult pharmacy to dose Vanc if appropriate.     Hospital approved surgical skin cleanser and instructions to return bottle on DOS given per hospital policy.    Patient provided with handouts including Guide to Surgery, Pain Management, Preventing Surgical Site Infections, and [facility] Brochure.    Patient answered medical/surgical history questions at their best of ability. All prior to admission medications documented in Epic. Original medication prescription bottle was not visualized during patient appointment.     Patient instructed to hold all vitamins 3 weeks prior to surgery and NSAIDS 5 days prior to surgery.     Patient teach back successful and patient demonstrates knowledge of instruction.

## 2025-03-06 NOTE — TELEPHONE ENCOUNTER
She is having surgery on March 20. She is out of Meloxicam. Can you send in a refill to [facility] on [address].  Will she need to stop this before surgery?

## 2025-03-06 NOTE — TELEPHONE ENCOUNTER
Called and spoke to pt to inform her that [facility] has agreed to refill the Meloxicam and will send it to her pharmacy. Pt also stated she was concerned about post-op recovery. Discussed concerns with the pt. Pt voiced understanding.

## 2025-03-07 LAB
MRSA DNA SPEC QL NAA+PROBE: NOT DETECTED
S AUREUS CPE NOSE QL NAA+PROBE: DETECTED

## 2025-03-15 PROBLEM — M75.21 BICIPITAL TENDINITIS OF RIGHT SHOULDER: Status: ACTIVE | Noted: 2025-03-15

## 2025-03-15 NOTE — H&P
Subjective:     Patient is a 60 y.o. RHD FEMALE WITH RIGHT SHOULDER PAIN.    SEE OFFICE NOTE.    Patient Active Problem List    Diagnosis Date Noted    Rotator cuff tear arthropathy of right shoulder 02/05/2025    Bicipital tendinitis of right shoulder 03/15/2025    Depression     GERD (gastroesophageal reflux disease)     Interstitial cystitis     Arthritis     Dyslipidemia     Chronic pain     Allergic rhinitis     MARIPOSA (obstructive sleep apnea) 10/21/2020    Hypersomnia 10/21/2020    Non-restorative sleep 10/21/2020     Past Medical History:   Diagnosis Date    Allergic rhinitis     Arthritis     chronic back pain    Chronic pain     Fibromyalgia, arthritis    Cryoglobulinemia 2008    Depression     Hx of    Dog bite 01/2023    several surgeries    Dyslipidemia     No meds    GERD (gastroesophageal reflux disease)     Omeprazole daily    History of stomach ulcers     Interstitial cystitis     Mild sleep apnea     does not use c-pap    Palpitations     PONV (postoperative nausea and vomiting)     Raynaud disease     Sleep disorder     idiopathic hypersomnia      Past Surgical History:   Procedure Laterality Date    BREAST SURGERY  2002    reduction    CHOLECYSTECTOMY, LAPAROSCOPIC  1997    GYN      cryo (froze cervix)    LEG DEBRIDEMENT Left     x2 1/2/23, 1/3/23 after dog bites    REFRACTIVE SURGERY  2002    SHOULDER ARTHROSCOPY Right 2005    rotator cuff      Prior to Admission medications    Medication Sig Start Date End Date Taking? Authorizing Provider   meloxicam (MOBIC) 15 MG tablet Take 1 tablet by mouth daily 3/6/25 4/5/25  Mckinley Wheat Jr., MD   meloxicam (MOBIC) 15 MG tablet Take 1 tablet by mouth daily 2/5/25 3/7/25  Mckinley Wheat Jr., MD   HYDROcodone-acetaminophen (NORCO) 7.5-325 MG per tablet Take 1 tablet by mouth every 8 hours as needed. 3/31/17   Automatic Reconciliation, Ar   omeprazole (PRILOSEC OTC) 20 MG tablet Take 1 tablet by mouth daily 11/29/17   Automatic Reconciliation, Ar

## 2025-03-19 ENCOUNTER — ANESTHESIA EVENT (OUTPATIENT)
Dept: SURGERY | Age: 60
End: 2025-03-19
Payer: COMMERCIAL

## 2025-03-19 ENCOUNTER — PREP FOR PROCEDURE (OUTPATIENT)
Dept: ORTHOPEDIC SURGERY | Age: 60
End: 2025-03-19

## 2025-03-19 DIAGNOSIS — M75.101 ROTATOR CUFF TEAR ARTHROPATHY OF RIGHT SHOULDER: Primary | ICD-10-CM

## 2025-03-19 DIAGNOSIS — M12.811 ROTATOR CUFF TEAR ARTHROPATHY OF RIGHT SHOULDER: Primary | ICD-10-CM

## 2025-03-19 RX ORDER — SODIUM CHLORIDE 0.9 % (FLUSH) 0.9 %
5-40 SYRINGE (ML) INJECTION EVERY 12 HOURS SCHEDULED
Status: CANCELLED | OUTPATIENT
Start: 2025-03-19

## 2025-03-19 RX ORDER — SODIUM CHLORIDE 0.9 % (FLUSH) 0.9 %
5-40 SYRINGE (ML) INJECTION PRN
Status: CANCELLED | OUTPATIENT
Start: 2025-03-19

## 2025-03-19 RX ORDER — PROMETHAZINE HYDROCHLORIDE 25 MG/1
25 TABLET ORAL EVERY 6 HOURS PRN
Qty: 20 TABLET | Refills: 0 | Status: SHIPPED | OUTPATIENT
Start: 2025-03-19

## 2025-03-19 RX ORDER — OXYCODONE HYDROCHLORIDE 10 MG/1
10 TABLET ORAL EVERY 6 HOURS PRN
Qty: 28 TABLET | Refills: 0 | Status: ON HOLD | OUTPATIENT
Start: 2025-03-19 | End: 2025-03-21 | Stop reason: HOSPADM

## 2025-03-19 RX ORDER — SODIUM CHLORIDE 9 MG/ML
INJECTION, SOLUTION INTRAVENOUS PRN
Status: CANCELLED | OUTPATIENT
Start: 2025-03-19

## 2025-03-19 RX ORDER — KETOROLAC TROMETHAMINE 10 MG/1
TABLET, FILM COATED ORAL
Qty: 20 TABLET | Refills: 0 | Status: SHIPPED | OUTPATIENT
Start: 2025-03-19

## 2025-03-19 NOTE — DISCHARGE SUMMARY
[facility] Discharge Summary      Patient ID:  Gracie Farrell  [id number]  60 y.o.  1965    Allergies: Bee venom, Sulfa antibiotics, and Aspirin    Admit date: 3/20/2025    Discharge date and time: 3/21/2025    Admitting Physician: Mckinley Wheat Jr., MD     Discharge Physician: MCKINLEY WHEAT JR, MD      * Admission Diagnoses: Rotator cuff tear arthropathy of right shoulder [M75.101, M12.811]    * Discharge Diagnoses: Principal Problem:    Rotator cuff tear arthropathy of right shoulder  Active Problems:    Bicipital tendinitis of right shoulder  Resolved Problems:    * No resolved hospital problems. *      Surgeon: MCKINLEY WHEAT JR, MD          * Procedure: Procedure(s) with comments:  right reverse total shoulder arthroplasty with a delta xtend prosthesis and biceps tenodesisgeneral/interscalene. 23hr - interscalene           Perioperative Antibiotics: Ancef  _x__                                                Vancomycin  ___          Post Op complications: none        * Discharge Condition: Good  Wound appears to be healing without any evidence of infection.         * Discharged to: Home    * Follow-up Care/Discharge instructions:  - Resume pre hospital diet            - Resume home medications per medical continuation form     CONTINUE PHYSICAL THERAPY  Sling right shoulder  - Follow up in office as scheduled       Signed:  MCKINLEY WHEAT JR, MD  3/21/2025  6:52 AM

## 2025-03-20 ENCOUNTER — ANESTHESIA (OUTPATIENT)
Dept: SURGERY | Age: 60
End: 2025-03-20
Payer: COMMERCIAL

## 2025-03-20 ENCOUNTER — HOSPITAL ENCOUNTER (OUTPATIENT)
Age: 60
Setting detail: OBSERVATION
Discharge: HOME | End: 2025-03-21
Attending: ORTHOPAEDIC SURGERY | Admitting: ORTHOPAEDIC SURGERY
Payer: COMMERCIAL

## 2025-03-20 ENCOUNTER — APPOINTMENT (OUTPATIENT)
Dept: GENERAL RADIOLOGY | Age: 60
End: 2025-03-20
Attending: ORTHOPAEDIC SURGERY
Payer: COMMERCIAL

## 2025-03-20 DIAGNOSIS — M12.811 ROTATOR CUFF TEAR ARTHROPATHY OF RIGHT SHOULDER: ICD-10-CM

## 2025-03-20 DIAGNOSIS — M75.101 ROTATOR CUFF TEAR ARTHROPATHY OF RIGHT SHOULDER: ICD-10-CM

## 2025-03-20 LAB
25(OH)D3 SERPL-MCNC: 16.6 NG/ML (ref 30–100)
ABO + RH BLD: NORMAL
BLOOD GROUP ANTIBODIES SERPL: NORMAL
EST. AVERAGE GLUCOSE BLD GHB EST-MCNC: 99 MG/DL
GLUCOSE BLD STRIP.AUTO-MCNC: 99 MG/DL (ref 65–100)
HBA1C MFR BLD: 5.1 % (ref 0–5.6)
SERVICE CMNT-IMP: NORMAL
SPECIMEN EXP DATE BLD: NORMAL

## 2025-03-20 PROCEDURE — 3600000005 HC SURGERY LEVEL 5 BASE: Performed by: ORTHOPAEDIC SURGERY

## 2025-03-20 PROCEDURE — C1713 ANCHOR/SCREW BN/BN,TIS/BN: HCPCS | Performed by: ORTHOPAEDIC SURGERY

## 2025-03-20 PROCEDURE — 6370000000 HC RX 637 (ALT 250 FOR IP): Performed by: ORTHOPAEDIC SURGERY

## 2025-03-20 PROCEDURE — 2580000003 HC RX 258: Performed by: ANESTHESIOLOGY

## 2025-03-20 PROCEDURE — 2500000003 HC RX 250 WO HCPCS: Performed by: NURSE ANESTHETIST, CERTIFIED REGISTERED

## 2025-03-20 PROCEDURE — 6360000002 HC RX W HCPCS: Performed by: ANESTHESIOLOGY

## 2025-03-20 PROCEDURE — 86901 BLOOD TYPING SEROLOGIC RH(D): CPT

## 2025-03-20 PROCEDURE — 6360000002 HC RX W HCPCS: Performed by: ORTHOPAEDIC SURGERY

## 2025-03-20 PROCEDURE — 7100000001 HC PACU RECOVERY - ADDTL 15 MIN: Performed by: ORTHOPAEDIC SURGERY

## 2025-03-20 PROCEDURE — 6360000002 HC RX W HCPCS: Performed by: NURSE ANESTHETIST, CERTIFIED REGISTERED

## 2025-03-20 PROCEDURE — 2700000000 HC OXYGEN THERAPY PER DAY

## 2025-03-20 PROCEDURE — 2500000003 HC RX 250 WO HCPCS: Performed by: ORTHOPAEDIC SURGERY

## 2025-03-20 PROCEDURE — 6370000000 HC RX 637 (ALT 250 FOR IP): Performed by: ANESTHESIOLOGY

## 2025-03-20 PROCEDURE — 2709999900 HC NON-CHARGEABLE SUPPLY: Performed by: ORTHOPAEDIC SURGERY

## 2025-03-20 PROCEDURE — 83036 HEMOGLOBIN GLYCOSYLATED A1C: CPT

## 2025-03-20 PROCEDURE — 64415 NJX AA&/STRD BRCH PLXS IMG: CPT | Performed by: ANESTHESIOLOGY

## 2025-03-20 PROCEDURE — 23472 RECONSTRUCT SHOULDER JOINT: CPT | Performed by: ORTHOPAEDIC SURGERY

## 2025-03-20 PROCEDURE — 73030 X-RAY EXAM OF SHOULDER: CPT

## 2025-03-20 PROCEDURE — 7100000000 HC PACU RECOVERY - FIRST 15 MIN: Performed by: ORTHOPAEDIC SURGERY

## 2025-03-20 PROCEDURE — 23430 REPAIR BICEPS TENDON: CPT | Performed by: ORTHOPAEDIC SURGERY

## 2025-03-20 PROCEDURE — 2720000010 HC SURG SUPPLY STERILE: Performed by: ORTHOPAEDIC SURGERY

## 2025-03-20 PROCEDURE — 82306 VITAMIN D 25 HYDROXY: CPT

## 2025-03-20 PROCEDURE — 86900 BLOOD TYPING SEROLOGIC ABO: CPT

## 2025-03-20 PROCEDURE — C1776 JOINT DEVICE (IMPLANTABLE): HCPCS | Performed by: ORTHOPAEDIC SURGERY

## 2025-03-20 PROCEDURE — 3700000000 HC ANESTHESIA ATTENDED CARE: Performed by: ORTHOPAEDIC SURGERY

## 2025-03-20 PROCEDURE — 82962 GLUCOSE BLOOD TEST: CPT

## 2025-03-20 PROCEDURE — G0378 HOSPITAL OBSERVATION PER HR: HCPCS

## 2025-03-20 PROCEDURE — 3700000001 HC ADD 15 MINUTES (ANESTHESIA): Performed by: ORTHOPAEDIC SURGERY

## 2025-03-20 PROCEDURE — 94761 N-INVAS EAR/PLS OXIMETRY MLT: CPT

## 2025-03-20 PROCEDURE — 3600000015 HC SURGERY LEVEL 5 ADDTL 15MIN: Performed by: ORTHOPAEDIC SURGERY

## 2025-03-20 PROCEDURE — 86850 RBC ANTIBODY SCREEN: CPT

## 2025-03-20 DEVICE — PLUG, CEMENT SZ. 10.0
Type: IMPLANTABLE DEVICE | Site: SHOULDER | Status: FUNCTIONAL
Brand: DJO SURGICAL

## 2025-03-20 DEVICE — DELTA XTEND STANDARD HUMERAL PE CUP DIA38 /+6MM STD
Type: IMPLANTABLE DEVICE | Site: SHOULDER | Status: FUNCTIONAL
Brand: DELTA XTEND

## 2025-03-20 DEVICE — DELTA XTEND LATERALIZED GLENOSPHERE +6MM ECCENTRIC 038MM
Type: IMPLANTABLE DEVICE | Site: SHOULDER | Status: FUNCTIONAL
Brand: DELTA XTEND

## 2025-03-20 DEVICE — SCREW BNE L14MM DIA4.5MM PROX CORT TIB S STL ST LOK FULL: Type: IMPLANTABLE DEVICE | Site: SHOULDER | Status: FUNCTIONAL

## 2025-03-20 DEVICE — TOBRA FULL DOSE ANTIBIOTIC BONE CEMENT, 10 PACK CATALOG NUMBER IS 6197-9-010
Type: IMPLANTABLE DEVICE | Site: SHOULDER | Status: FUNCTIONAL
Brand: SIMPLEX

## 2025-03-20 DEVICE — DELTA XTEND MONOBLOC HUM CEMENTED EPIPHYSIS SZ1 /DIA8 STD
Type: IMPLANTABLE DEVICE | Site: SHOULDER | Status: FUNCTIONAL
Brand: DELTA XTEND

## 2025-03-20 DEVICE — SCREW BNE L40MM DIA4.5MM PROX CORT TIB S STL ST LOK FULL: Type: IMPLANTABLE DEVICE | Site: SHOULDER | Status: FUNCTIONAL

## 2025-03-20 DEVICE — SCREW BNE L48MM DIA4.5MM PROX CORT TIB S STL ST LOK FULL: Type: IMPLANTABLE DEVICE | Site: SHOULDER | Status: FUNCTIONAL

## 2025-03-20 DEVICE — DELTA XTEND CEMENTLESS METAGLENE +10MM HA
Type: IMPLANTABLE DEVICE | Site: SHOULDER | Status: FUNCTIONAL
Brand: DELTA XTEND

## 2025-03-20 DEVICE — SHOULDER S3 TOT ADV REVERSE IMPL CAPPED S3: Type: IMPLANTABLE DEVICE | Status: FUNCTIONAL

## 2025-03-20 RX ORDER — BISACODYL 10 MG
10 SUPPOSITORY, RECTAL RECTAL DAILY PRN
Status: DISCONTINUED | OUTPATIENT
Start: 2025-03-20 | End: 2025-03-21 | Stop reason: HOSPADM

## 2025-03-20 RX ORDER — OXYCODONE HYDROCHLORIDE 5 MG/1
5 TABLET ORAL PRN
Status: DISCONTINUED | OUTPATIENT
Start: 2025-03-20 | End: 2025-03-20 | Stop reason: HOSPADM

## 2025-03-20 RX ORDER — SODIUM CHLORIDE 9 MG/ML
INJECTION, SOLUTION INTRAVENOUS PRN
Status: DISCONTINUED | OUTPATIENT
Start: 2025-03-20 | End: 2025-03-21 | Stop reason: HOSPADM

## 2025-03-20 RX ORDER — SODIUM CHLORIDE 0.9 % (FLUSH) 0.9 %
5-40 SYRINGE (ML) INJECTION PRN
Status: DISCONTINUED | OUTPATIENT
Start: 2025-03-20 | End: 2025-03-20 | Stop reason: SDUPTHER

## 2025-03-20 RX ORDER — LIDOCAINE HYDROCHLORIDE 10 MG/ML
1 INJECTION, SOLUTION INFILTRATION; PERINEURAL
Status: COMPLETED | OUTPATIENT
Start: 2025-03-20 | End: 2025-03-20

## 2025-03-20 RX ORDER — ACETAMINOPHEN 500 MG
1000 TABLET ORAL ONCE
Status: COMPLETED | OUTPATIENT
Start: 2025-03-20 | End: 2025-03-20

## 2025-03-20 RX ORDER — NALOXONE HYDROCHLORIDE 0.4 MG/ML
INJECTION, SOLUTION INTRAMUSCULAR; INTRAVENOUS; SUBCUTANEOUS PRN
Status: DISCONTINUED | OUTPATIENT
Start: 2025-03-20 | End: 2025-03-20 | Stop reason: HOSPADM

## 2025-03-20 RX ORDER — DEXAMETHASONE SODIUM PHOSPHATE 10 MG/ML
INJECTION, SOLUTION INTRAMUSCULAR; INTRAVENOUS
Status: COMPLETED | OUTPATIENT
Start: 2025-03-20 | End: 2025-03-20

## 2025-03-20 RX ORDER — SODIUM CHLORIDE 9 MG/ML
INJECTION, SOLUTION INTRAVENOUS PRN
Status: DISCONTINUED | OUTPATIENT
Start: 2025-03-20 | End: 2025-03-20 | Stop reason: HOSPADM

## 2025-03-20 RX ORDER — SODIUM CHLORIDE 0.9 % (FLUSH) 0.9 %
5-40 SYRINGE (ML) INJECTION PRN
Status: DISCONTINUED | OUTPATIENT
Start: 2025-03-20 | End: 2025-03-20 | Stop reason: HOSPADM

## 2025-03-20 RX ORDER — DEXAMETHASONE SODIUM PHOSPHATE 10 MG/ML
INJECTION, SOLUTION INTRA-ARTICULAR; INTRALESIONAL; INTRAMUSCULAR; INTRAVENOUS; SOFT TISSUE
Status: DISCONTINUED | OUTPATIENT
Start: 2025-03-20 | End: 2025-03-20 | Stop reason: SDUPTHER

## 2025-03-20 RX ORDER — EPHEDRINE SULFATE/0.9% NACL/PF 50 MG/5 ML
SYRINGE (ML) INTRAVENOUS
Status: DISCONTINUED | OUTPATIENT
Start: 2025-03-20 | End: 2025-03-20 | Stop reason: SDUPTHER

## 2025-03-20 RX ORDER — MIDAZOLAM HYDROCHLORIDE 2 MG/2ML
2 INJECTION, SOLUTION INTRAMUSCULAR; INTRAVENOUS
Status: COMPLETED | OUTPATIENT
Start: 2025-03-20 | End: 2025-03-20

## 2025-03-20 RX ORDER — ROCURONIUM BROMIDE 10 MG/ML
INJECTION, SOLUTION INTRAVENOUS
Status: DISCONTINUED | OUTPATIENT
Start: 2025-03-20 | End: 2025-03-20 | Stop reason: SDUPTHER

## 2025-03-20 RX ORDER — SODIUM CHLORIDE 9 MG/ML
INJECTION, SOLUTION INTRAVENOUS PRN
Status: DISCONTINUED | OUTPATIENT
Start: 2025-03-20 | End: 2025-03-20 | Stop reason: SDUPTHER

## 2025-03-20 RX ORDER — ONDANSETRON 2 MG/ML
4 INJECTION INTRAMUSCULAR; INTRAVENOUS
Status: COMPLETED | OUTPATIENT
Start: 2025-03-20 | End: 2025-03-20

## 2025-03-20 RX ORDER — FENTANYL CITRATE 50 UG/ML
INJECTION, SOLUTION INTRAMUSCULAR; INTRAVENOUS
Status: DISCONTINUED | OUTPATIENT
Start: 2025-03-20 | End: 2025-03-20 | Stop reason: SDUPTHER

## 2025-03-20 RX ORDER — OXYCODONE HYDROCHLORIDE 5 MG/1
10 TABLET ORAL PRN
Status: DISCONTINUED | OUTPATIENT
Start: 2025-03-20 | End: 2025-03-20 | Stop reason: HOSPADM

## 2025-03-20 RX ORDER — SODIUM CHLORIDE, SODIUM LACTATE, POTASSIUM CHLORIDE, CALCIUM CHLORIDE 600; 310; 30; 20 MG/100ML; MG/100ML; MG/100ML; MG/100ML
INJECTION, SOLUTION INTRAVENOUS CONTINUOUS
Status: DISCONTINUED | OUTPATIENT
Start: 2025-03-20 | End: 2025-03-20 | Stop reason: HOSPADM

## 2025-03-20 RX ORDER — SODIUM CHLORIDE 0.9 % (FLUSH) 0.9 %
5-40 SYRINGE (ML) INJECTION EVERY 12 HOURS SCHEDULED
Status: DISCONTINUED | OUTPATIENT
Start: 2025-03-20 | End: 2025-03-20 | Stop reason: SDUPTHER

## 2025-03-20 RX ORDER — HYDROMORPHONE HYDROCHLORIDE 1 MG/ML
1 INJECTION, SOLUTION INTRAMUSCULAR; INTRAVENOUS; SUBCUTANEOUS
Status: DISCONTINUED | OUTPATIENT
Start: 2025-03-20 | End: 2025-03-21 | Stop reason: HOSPADM

## 2025-03-20 RX ORDER — ONDANSETRON 2 MG/ML
INJECTION INTRAMUSCULAR; INTRAVENOUS
Status: DISCONTINUED | OUTPATIENT
Start: 2025-03-20 | End: 2025-03-20 | Stop reason: SDUPTHER

## 2025-03-20 RX ORDER — SODIUM CHLORIDE 0.9 % (FLUSH) 0.9 %
5-40 SYRINGE (ML) INJECTION EVERY 12 HOURS SCHEDULED
Status: DISCONTINUED | OUTPATIENT
Start: 2025-03-20 | End: 2025-03-21 | Stop reason: HOSPADM

## 2025-03-20 RX ORDER — SODIUM PHOSPHATE, DIBASIC AND SODIUM PHOSPHATE, MONOBASIC 7; 19 G/230ML; G/230ML
1 ENEMA RECTAL
Status: DISCONTINUED | OUTPATIENT
Start: 2025-03-20 | End: 2025-03-21 | Stop reason: HOSPADM

## 2025-03-20 RX ORDER — NEOSTIGMINE METHYLSULFATE 1 MG/ML
INJECTION, SOLUTION INTRAVENOUS
Status: DISCONTINUED | OUTPATIENT
Start: 2025-03-20 | End: 2025-03-20 | Stop reason: SDUPTHER

## 2025-03-20 RX ORDER — ONDANSETRON 4 MG/1
4 TABLET, ORALLY DISINTEGRATING ORAL EVERY 8 HOURS PRN
Status: DISCONTINUED | OUTPATIENT
Start: 2025-03-20 | End: 2025-03-21 | Stop reason: HOSPADM

## 2025-03-20 RX ORDER — SODIUM CHLORIDE 0.9 % (FLUSH) 0.9 %
5-40 SYRINGE (ML) INJECTION EVERY 12 HOURS SCHEDULED
Status: DISCONTINUED | OUTPATIENT
Start: 2025-03-20 | End: 2025-03-20 | Stop reason: HOSPADM

## 2025-03-20 RX ORDER — GLYCOPYRROLATE 0.2 MG/ML
INJECTION INTRAMUSCULAR; INTRAVENOUS
Status: DISCONTINUED | OUTPATIENT
Start: 2025-03-20 | End: 2025-03-20 | Stop reason: SDUPTHER

## 2025-03-20 RX ORDER — SCOPOLAMINE 1 MG/3D
1 PATCH, EXTENDED RELEASE TRANSDERMAL ONCE
Status: DISCONTINUED | OUTPATIENT
Start: 2025-03-20 | End: 2025-03-21 | Stop reason: HOSPADM

## 2025-03-20 RX ORDER — PROPOFOL 10 MG/ML
INJECTION, EMULSION INTRAVENOUS
Status: DISCONTINUED | OUTPATIENT
Start: 2025-03-20 | End: 2025-03-20 | Stop reason: SDUPTHER

## 2025-03-20 RX ORDER — FENTANYL CITRATE 50 UG/ML
100 INJECTION, SOLUTION INTRAMUSCULAR; INTRAVENOUS
Status: COMPLETED | OUTPATIENT
Start: 2025-03-20 | End: 2025-03-20

## 2025-03-20 RX ORDER — LIDOCAINE HYDROCHLORIDE 20 MG/ML
INJECTION, SOLUTION EPIDURAL; INFILTRATION; INTRACAUDAL; PERINEURAL
Status: DISCONTINUED | OUTPATIENT
Start: 2025-03-20 | End: 2025-03-20 | Stop reason: SDUPTHER

## 2025-03-20 RX ORDER — OXYCODONE HYDROCHLORIDE 5 MG/1
20 TABLET ORAL
Refills: 0 | Status: DISCONTINUED | OUTPATIENT
Start: 2025-03-20 | End: 2025-03-21

## 2025-03-20 RX ORDER — SODIUM CHLORIDE 0.9 % (FLUSH) 0.9 %
5-40 SYRINGE (ML) INJECTION PRN
Status: DISCONTINUED | OUTPATIENT
Start: 2025-03-20 | End: 2025-03-21 | Stop reason: HOSPADM

## 2025-03-20 RX ORDER — DOCUSATE SODIUM 100 MG/1
100 CAPSULE, LIQUID FILLED ORAL 2 TIMES DAILY
Status: DISCONTINUED | OUTPATIENT
Start: 2025-03-21 | End: 2025-03-21 | Stop reason: HOSPADM

## 2025-03-20 RX ORDER — PROCHLORPERAZINE EDISYLATE 5 MG/ML
5 INJECTION INTRAMUSCULAR; INTRAVENOUS
Status: COMPLETED | OUTPATIENT
Start: 2025-03-20 | End: 2025-03-20

## 2025-03-20 RX ORDER — FERROUS SULFATE 325(65) MG
325 TABLET ORAL 2 TIMES DAILY WITH MEALS
Status: DISCONTINUED | OUTPATIENT
Start: 2025-03-21 | End: 2025-03-21 | Stop reason: HOSPADM

## 2025-03-20 RX ORDER — PROMETHAZINE HYDROCHLORIDE 25 MG/1
25 TABLET ORAL EVERY 4 HOURS PRN
Status: DISCONTINUED | OUTPATIENT
Start: 2025-03-20 | End: 2025-03-21 | Stop reason: HOSPADM

## 2025-03-20 RX ORDER — OXYCODONE HYDROCHLORIDE 5 MG/1
10 TABLET ORAL
Refills: 0 | Status: DISCONTINUED | OUTPATIENT
Start: 2025-03-20 | End: 2025-03-21

## 2025-03-20 RX ADMIN — Medication 10 MG: at 13:53

## 2025-03-20 RX ADMIN — PHENYLEPHRINE HYDROCHLORIDE 100 MCG: 0.1 INJECTION, SOLUTION INTRAVENOUS at 13:53

## 2025-03-20 RX ADMIN — ROPIVACAINE HYDROCHLORIDE 25 ML: 5 INJECTION, SOLUTION EPIDURAL; INFILTRATION; PERINEURAL at 11:50

## 2025-03-20 RX ADMIN — ONDANSETRON 4 MG: 2 INJECTION INTRAMUSCULAR; INTRAVENOUS at 12:48

## 2025-03-20 RX ADMIN — SODIUM CHLORIDE, SODIUM LACTATE, POTASSIUM CHLORIDE, AND CALCIUM CHLORIDE: 600; 310; 30; 20 INJECTION, SOLUTION INTRAVENOUS at 10:21

## 2025-03-20 RX ADMIN — PHENYLEPHRINE HYDROCHLORIDE 100 MCG: 0.1 INJECTION, SOLUTION INTRAVENOUS at 13:18

## 2025-03-20 RX ADMIN — DEXAMETHASONE SODIUM PHOSPHATE 4 MG: 10 INJECTION, SOLUTION INTRAMUSCULAR; INTRAVENOUS at 11:50

## 2025-03-20 RX ADMIN — Medication 3 MG: at 14:02

## 2025-03-20 RX ADMIN — PHENYLEPHRINE HYDROCHLORIDE 100 MCG: 0.1 INJECTION, SOLUTION INTRAVENOUS at 12:48

## 2025-03-20 RX ADMIN — PROPOFOL 110 MG: 10 INJECTION, EMULSION INTRAVENOUS at 12:36

## 2025-03-20 RX ADMIN — PROCHLORPERAZINE EDISYLATE 5 MG: 5 INJECTION INTRAMUSCULAR; INTRAVENOUS at 14:29

## 2025-03-20 RX ADMIN — PHENYLEPHRINE HYDROCHLORIDE 100 MCG: 0.1 INJECTION, SOLUTION INTRAVENOUS at 13:17

## 2025-03-20 RX ADMIN — DEXAMETHASONE SODIUM PHOSPHATE 5 MG: 10 INJECTION INTRAMUSCULAR; INTRAVENOUS at 12:48

## 2025-03-20 RX ADMIN — Medication 10 MG: at 13:44

## 2025-03-20 RX ADMIN — Medication 10 MG: at 13:36

## 2025-03-20 RX ADMIN — WATER 1000 MG: 1 INJECTION INTRAMUSCULAR; INTRAVENOUS; SUBCUTANEOUS at 18:25

## 2025-03-20 RX ADMIN — SODIUM CHLORIDE, SODIUM LACTATE, POTASSIUM CHLORIDE, AND CALCIUM CHLORIDE: 600; 310; 30; 20 INJECTION, SOLUTION INTRAVENOUS at 13:17

## 2025-03-20 RX ADMIN — PHENYLEPHRINE HYDROCHLORIDE 100 MCG: 0.1 INJECTION, SOLUTION INTRAVENOUS at 13:11

## 2025-03-20 RX ADMIN — PHENYLEPHRINE HYDROCHLORIDE 100 MCG: 0.1 INJECTION, SOLUTION INTRAVENOUS at 13:24

## 2025-03-20 RX ADMIN — PHENYLEPHRINE HYDROCHLORIDE 100 MCG: 0.1 INJECTION, SOLUTION INTRAVENOUS at 12:59

## 2025-03-20 RX ADMIN — CHOLECALCIFEROL TAB 125 MCG (5000 UNIT) 5000 UNITS: 125 TAB at 18:25

## 2025-03-20 RX ADMIN — PHENYLEPHRINE HYDROCHLORIDE 100 MCG: 0.1 INJECTION, SOLUTION INTRAVENOUS at 13:08

## 2025-03-20 RX ADMIN — LIDOCAINE HYDROCHLORIDE 1 ML: 10 INJECTION, SOLUTION INFILTRATION; PERINEURAL at 10:22

## 2025-03-20 RX ADMIN — LIDOCAINE HYDROCHLORIDE 40 MG: 20 INJECTION, SOLUTION EPIDURAL; INFILTRATION; INTRACAUDAL; PERINEURAL at 12:36

## 2025-03-20 RX ADMIN — ROCURONIUM BROMIDE 25 MG: 10 INJECTION, SOLUTION INTRAVENOUS at 12:36

## 2025-03-20 RX ADMIN — FENTANYL CITRATE 50 MCG: 50 INJECTION INTRAMUSCULAR; INTRAVENOUS at 11:51

## 2025-03-20 RX ADMIN — FENTANYL CITRATE 25 MCG: 50 INJECTION, SOLUTION INTRAMUSCULAR; INTRAVENOUS at 13:27

## 2025-03-20 RX ADMIN — MIDAZOLAM 1 MG: 1 INJECTION INTRAMUSCULAR; INTRAVENOUS at 11:51

## 2025-03-20 RX ADMIN — Medication 2000 MG: at 12:47

## 2025-03-20 RX ADMIN — ONDANSETRON 4 MG: 2 INJECTION, SOLUTION INTRAMUSCULAR; INTRAVENOUS at 14:22

## 2025-03-20 RX ADMIN — ACETAMINOPHEN 1000 MG: 500 TABLET, FILM COATED ORAL at 10:09

## 2025-03-20 RX ADMIN — GLYCOPYRROLATE 0.4 MG: 0.2 INJECTION INTRAMUSCULAR; INTRAVENOUS at 14:02

## 2025-03-20 RX ADMIN — ROCURONIUM BROMIDE 5 MG: 10 INJECTION, SOLUTION INTRAVENOUS at 13:26

## 2025-03-20 RX ADMIN — HYDROMORPHONE HYDROCHLORIDE 0.25 MG: 1 INJECTION, SOLUTION INTRAMUSCULAR; INTRAVENOUS; SUBCUTANEOUS at 14:28

## 2025-03-20 RX ADMIN — PHENYLEPHRINE HYDROCHLORIDE 100 MCG: 0.1 INJECTION, SOLUTION INTRAVENOUS at 13:30

## 2025-03-20 RX ADMIN — PHENYLEPHRINE HYDROCHLORIDE 100 MCG: 0.1 INJECTION, SOLUTION INTRAVENOUS at 13:35

## 2025-03-20 ASSESSMENT — PAIN SCALES - GENERAL
PAINLEVEL_OUTOF10: 0
PAINLEVEL_OUTOF10: 4
PAINLEVEL_OUTOF10: 4
PAINLEVEL_OUTOF10: 6

## 2025-03-20 ASSESSMENT — PAIN DESCRIPTION - ONSET: ONSET: ON-GOING

## 2025-03-20 ASSESSMENT — PAIN DESCRIPTION - DESCRIPTORS
DESCRIPTORS: ACHING
DESCRIPTORS: ACHING

## 2025-03-20 ASSESSMENT — PAIN DESCRIPTION - FREQUENCY: FREQUENCY: CONTINUOUS

## 2025-03-20 ASSESSMENT — LIFESTYLE VARIABLES: SMOKING_STATUS: 1

## 2025-03-20 ASSESSMENT — PAIN DESCRIPTION - ORIENTATION: ORIENTATION: RIGHT

## 2025-03-20 ASSESSMENT — PAIN DESCRIPTION - PAIN TYPE: TYPE: SURGICAL PAIN

## 2025-03-20 ASSESSMENT — PAIN - FUNCTIONAL ASSESSMENT: PAIN_FUNCTIONAL_ASSESSMENT: 0-10

## 2025-03-20 ASSESSMENT — PAIN DESCRIPTION - LOCATION: LOCATION: SHOULDER

## 2025-03-20 NOTE — PROGRESS NOTES
TRANSFER - IN REPORT:    Verbal report received from Tamar Li RN on Gracie Farrell  being received from PACU for routine post-op      Report consisted of patient's Situation, Background, Assessment and   Recommendations(SBAR).     Information from the following report(s) Nurse Handoff Report, Surgery Report, and Intake/Output was reviewed with the receiving nurse.    Opportunity for questions and clarification was provided.      Assessment completed upon patient's arrival to unit and care assumed.

## 2025-03-20 NOTE — ANESTHESIA POSTPROCEDURE EVALUATION
Department of Anesthesiology  Postprocedure Note    Patient: Gracie Farrell  MRN: [id number]  YOB: 1965  Date of evaluation: 3/20/2025    Procedure Summary       Date: 03/20/25 Room / Location: [facility] MAIN OR 03 / [facility] MAIN OR    Anesthesia Start: 1232 Anesthesia Stop: 1415    Procedure: REVERSE RIGHT TOTAL SHOULDER ARTHROPLASTY WITH DELTA EXTEND PROSTHESIS, BICEPS TENODESIS (Right: Shoulder) Diagnosis:       Rotator cuff tear arthropathy of right shoulder      (Rotator cuff tear arthropathy of right shoulder [M75.101, M12.811])    Surgeons: Mckinley Wheat Jr., MD Responsible Provider: Baldemar Brooks MD    Anesthesia Type: General ASA Status: 2            Anesthesia Type: General    David Phase I: David Score: 9    David Phase II:      Anesthesia Post Evaluation    Patient location during evaluation: PACU  Patient participation: complete - patient participated  Level of consciousness: awake  Airway patency: patent  Nausea & Vomiting: no nausea  Cardiovascular status: hemodynamically stable  Respiratory status: acceptable, nonlabored ventilation and spontaneous ventilation  Hydration status: stable  Multimodal analgesia pain management approach    No notable events documented.

## 2025-03-20 NOTE — OP NOTE
[facility] & [address]  [address]                            OPERATIVE REPORT      PATIENT NAME: DIO CAVANAUGH     : 1965  MED REC NO: [id number]                       ROOM: [facility]  [facility] NO: [id number]                       ADMIT DATE: 2025  PROVIDER: Mckinley Wheat Jr, MD    DATE OF SERVICE:  2025    PREOPERATIVE DIAGNOSES:       1. Rotator cuff tear arthropathy, right shoulder.     2. Biceps tendinitis, right shoulder.    POSTOPERATIVE DIAGNOSES:       1. Rotator cuff tear arthropathy, right shoulder.     2. Biceps tendinitis, right shoulder.    PROCEDURES PERFORMED:  Reverse right total shoulder arthroplasty with a Delta Xtend prosthesis, biceps tenodesis    SURGEON:  Mckinley Wheat Jr, MD        ANESTHESIA:  General with interscalene block.    ESTIMATED BLOOD LOSS:  70 mL.    SPECIMENS REMOVED:  Pathology,     1. Rotator cuff tear arthropathy.     2. Biceps tendinitis.         COMPLICATIONS:  None.    IMPLANTS:  Hardware utilized:  DePuy +10 metaglene, 38 eccentric +6 mm lateralized glenosphere, 38 +6 cup, 8.1 stem 10 mm Biostop G, 48 mm superior, 40 mm inferior, and a 40 mm anterior nonlocking cortical screw.    INDICATIONS:  The patient is a 60-year-old female I have operated on the right shoulder previously in  and she had a fantastic result.  In , she was attacked by two of her neighbor's pit bulls injuring her right shoulder.  Preoperative physical exam, radiographs, and an MRI demonstrated rotator cuff tear arthropathy, right shoulder and biceps tendinitis, right shoulder.  The patient has exhausted nonoperative measures electively for operative intervention.    DESCRIPTION OF PROCEDURE:  Following identification of the patient, the patient was taken to the operative suite.  Following induction of general anesthesia, interscalene block for postop pain control, 2 g of IV Ancef, the patient was very  irrigated and dried.  A 10 mm Biostop G was then placed distally.  Antibiotic impregnated cement was mixed in the humeral canal, and 8.1 stem was cemented in appropriate version.  Excessive cement was removed.  Once the cement was allowed to cure, a true 38 +6 cup was inserted.  Shoulder was reduced.  There was excellent stability with excellent mobility.  At this point, subscapularis was repaired back to the fins of prosthesis utilizing #5 Mersilene suture, modified Brandin-Vishnu type fashion.  Biceps was tenodesed using #5 Mersilene sutures.  Arm was put through range of motion and stable.  The axillary nerve remained intact.  The wound was irrigated.  The deltopectoral interval was approximated with #2 Mersilene sutures, skin was closed with 0 Vicryl figure-of-eight sutures and a 2-0 Prolene subcuticular suture.  A sterile dressing was applied.  Sling and swathe were applied.  The patient was then transferred to the recovery room in stable condition.    This injury was secondary to an attack from a neighbor's pit bulls.    CPT CODES:  42130 and 24571 with a modifier 59.    ICD-10 CODES:  M12.811, M75.21.        MCKINLEY WHEAT JR, MD      AGP/AQS  D:  03/20/2025 14:06:43  T:  03/20/2025 15:50:44  JOB #:  [id number]    CC:   Mckinley Wheat Jr, MD

## 2025-03-20 NOTE — RT PROTOCOL NOTE
developed:  Aerosolized Medication Protocol   Bronchial Hygiene Protocol   Oxygen Protocol  CVRU Fast Track Weaning Protocol   Asthma Treatment Protocol ER  Pediatric Asthma Treatment Protocol ER  Alpha-1 Antitrypsin Deficiency Protocol  Prone Positioning Protocol   COPD Protocol   Home Oxygen Assessment Protocol  Ventilator Weaning Protocol   Lung Volume Expansion Protocol    The Director of Respiratory Care Services oversees the Patient Care Assessment Program. The Respiratory Educator is responsible for protocol development and training. The Supervisor is responsible for implementation and  activities.     Each patient with an order for respiratory treatments will receive an evaluation. Respiratory Care Practitioners (RCP's) will perform the evaluations. The same evaluation tool will be utilized for initial and follow-up assessments.    If the patient does not meet criteria for ordered therapy, the therapy will be discontinued.    If the patient demonstrates an adverse response to initially ordered therapy, the therapy will be discontinued and the physician will be contacted.    Specific physician's orders that deviate from protocols and are deemed \"inappropriate\" or \"unsafe\" will be addressed with ordering physician and/or medical director as required.         Respiratory Patient Care Assessment Protocols    I.  Policy:  In an effort to provide quality patient care and effective utilization of services, physicians who order respiratory therapy will have their patients treated via the protocols established (see attached) Respiratory Care Practitioners (RCP's) will complete the initial assessment which will indicate patient needs,  the care plan developed and will performed within 24 hours of admission. Frequency of the therapy will be set according to the results of the respiratory therapy evaluation and frequency guidelines policy. Reassessment will be continued every 48 hours and more  volumes, rates, pressures and FIO2.  Alarms are set appropriately.  Measured inspired gas temperature (invasive mode only)  Vital signs (pulse, respiratory rate, oxygen saturation, breath sounds)  Patient tolerance to therapy.  Manual resuscitator and appropriate size mask at patient bedside      REFERENCES  Respironics V60 user Manual ..\..\Equipment\Dedxxlmvmku-O79-Xdnkf-Manual.pdf    VIVO 50 clinical Guide ..\..\Equipment\VIVO 50 Mzzz_[id number].[id number]_lowres.pdf     National [facility] Critical Care Therapy and Respiratory Care Section.    AAMIR Garcia 2001.  Introducing non-invasive positive pressure ventilation. Nursing Standard.15,26, 42-45.     CLARKE Mcgraw 2003.  Using non-invasive ventilation in acute wards: part 2.Nursing Standard.18,1, 41-44.     Armando RUTH 1999.  Use of continuous positive airway pressure (CPAP) in the critically ill-physiological principles. Critical Care 12 (4 154-58     DOMINIQUE Ribera2002. Bi-level positive airway pressure (BiPAP) and acute cardiogenicpulmonary edema   ( ACPO) in the emergency department. Critical Care 15 (2):51-63        DEFINITIONS AND USUAL SETTINGS                                                            APPENDIX 1                      Settings   Settings in  Active   CPAP Settings in  Active   BiPAP Description Range Usual Setting  Used in NIV         CPAP Mode                     Continuous Positive Airway  Pressure   4-24 cmH20 8-14     ST or  BiPAP MODE                           Spontaneous Timed or BiLevel Positive Airway Pressure Ventilation. Two level system of alternating during non- invasive ventilation in sync with breathing-set IPAP and EPAP      __     __   AVAPS Mode    (only available  on V60)          The volume-targeted AVAPS (average volume-assured pressure support) mode combines the attributes of pressure-controlled and volume-targeted ventilation.      __     __       EPAP

## 2025-03-20 NOTE — ANESTHESIA PROCEDURE NOTES
Peripheral Block    Patient location during procedure: pre-op  Reason for block: post-op pain management and at surgeon's request  Start time: 3/20/2025 11:50 AM  End time: 3/20/2025 11:54 AM  Staffing  Performed: anesthesiologist   Anesthesiologist: OLIVIA Mott MD  Performed by: OLIVIA Mott MD  Authorized by: OLIVIA Mott MD    Preanesthetic Checklist  Completed: patient identified, IV checked, site marked, risks and benefits discussed, surgical/procedural consents, equipment checked, pre-op evaluation, timeout performed, anesthesia consent given, oxygen available and monitors applied/VS acknowledged  Peripheral Block   Patient position: supine (Semirecumbent)  Prep: ChloraPrep  Provider prep: mask and sterile gloves  Patient monitoring: cardiac monitor, continuous pulse ox, frequent blood pressure checks, IV access, oxygen and responsive to questions  Block type: Brachial plexus  Interscalene  Laterality: right  Injection technique: single-shot  Guidance: nerve stimulator and ultrasound guided    Needle   Needle type: insulated echogenic nerve stimulator needle   Needle gauge: 20 G  Needle localization: anatomical landmarks, nerve stimulator and ultrasound guidance  Assessment   Injection assessment: negative aspiration for heme, no paresthesia on injection, local visualized surrounding nerve on ultrasound and no intravascular symptoms  Slow fractionated injection: yes  Hemodynamics: stable  Outcomes: patient tolerated procedure well    Additional Notes  Local anesthetic visualized surrounding nerves with neural structure intact. Permanent image saved on chart.  0.375% Ropivacaine with epi 1:200,000 30 ml + Decadron 4 mg  Medications Administered  ROPivacaine 0.375% with EPINEPHrine 1:200,000 in NS injection (ANESTHESIA USE) (Mixture components: EPINEPHrine PF 1 MG/ML Soln, 0.005 mL; ROPivacaine 0.5% Soln, 0.75 mL; sodium chloride (PF) 0.9 % Soln, 0.245 mL) - Perineural   25 mL - 3/20/2025

## 2025-03-20 NOTE — PERIOP NOTE
TRANSFER - OUT REPORT:    Verbal report given to NADINE Ventura on Gracie Farrell  being transferred to [facility] for routine post-op       Report consisted of patient's Situation, Background, Assessment and   Recommendations(SBAR).     Information from the following report(s) Nurse Handoff Report, Adult Overview, Surgery Report, and MAR was reviewed with the receiving nurse.           Lines:   Peripheral IV 03/20/25 Distal;Left;Posterior Forearm (Active)   Site Assessment Clean, dry & intact 03/20/25 1458   Line Status Infusing 03/20/25 1458   Line Care Connections checked and tightened 03/20/25 1458   Phlebitis Assessment No symptoms 03/20/25 1458   Infiltration Assessment 0 03/20/25 1458   Alcohol Cap Used No 03/20/25 1458   Dressing Status Clean, dry & intact 03/20/25 1458   Dressing Type Transparent 03/20/25 1458        Opportunity for questions and clarification was provided.      Patient transported with:  O2 @ 2lpm

## 2025-03-20 NOTE — BRIEF OP NOTE
BRIEF OPERATIVE NOTE    Date of Procedure: 3/20/2025     Preoperative Diagnosis:  ROTATOR CUFF TEAR ARTHROPATHY RIGHT SHOULDER      BICEPS TENDINITIS RIGHT SHOULDER    Postoperative Diagnosis:  SAME    Procedure(s)  REVERSE RIGHT TOTAL SHOULDER ARTHROPLASTY WITH DELTA EXTEND PROSTHESIS, BICEPS TENODESIS    Surgeons and Role:     * Mckinley Wheat Jr., MD - Primary         Assistant Staff:  NONE    Surgical Staff:  Circulator: Jayy Osborne RN  Scrub Person First: Leonidas Richter  Scrub Person Second: (Unknown)      * Missing procedure start or end time(s) *    Anesthesia:  GENERAL WITH INTERSCALENE BLOCK    Estimated Blood Loss: 70 cc.    Complications: NONE    Implants:   Implant Name Type Inv. Item Serial No.  Lot No. LRB No. Used Action   COMPONENT LUKE FIX [id number] SHLDR METAGLENE LNG PEG GLOB - [id number]  COMPONENT LUKE FIX [id number] SHLDR METAGLENE LNG PEG GLOB  [facility] [employer] [facility] [id number] Right 1 Implanted   SCREW BNE L40MM DIA4.5MM PROX BRANDEN TIB S STL ST RICHARD FULL - [id number]  SCREW BNE L40MM DIA4.5MM PROX BRANDEN TIB S STL ST RICHARD FULL  DEPU[employer] [id number] Right 1 Implanted   SCREW BNE L48MM DIA4.5MM PROX BRANDEN TIB S STL ST R[place]ARD FULL - [id number]  SCREW BNE L48MM DIA4.5MM PROX BRANDEN TIB S STL ST RICHARD FULL  DEPUY SYNTH[employer]-WD [id number] Right 1 Implanted   SCREW BNE L14MM DIA4.5MM PROX BRANDEN TIB S STL ST RICHARD FULL - [id number]  SCREW BNE L14MM DIA4.5MM PROX BRANDEN TIB S STL ST RICHARD FULL  DEPUY SYNTHES [employer]-WD [id number] Right 2 Implanted   XTND GLENO ECC D38MM +6MM - [id number]  XTND GLENO ECC D38MM +6MM  [facility] [employer] [facility] [id number] Right 1 Implanted   STEM HUM SZ 1 L132MM DIA8MM STD SHLDR CO CHROM HA MIGUEL ÁNGEL - [id number]  STEM HUM SZ 1 L132MM DIA8MM STD SHLDR CO CHROM HA MIGUEL ÁNGEL  [facility] [employer] [facility] [id number] Right 1 Implanted   CEMENT BNE 20ML 41GM FULL DOSE PMMA W/ TOBRA M VISC RADPQ - [id number]  CEMENT BNE 20ML 41GM FULL DOSE PMMA W/ TOBRA M VISC  RADPQ  [employer] [facility]- [id number] Right 1 Implanted   RESTRICTOR MIGUEL ÁNGEL [id number] BIOABSRB HIP PLUG CLEARCUT - [id number]  RESTRICTOR MIGUEL ÁNGEL [id number] BIOABSRB HIP PLUG CLEARCUT  [facility] SURGICAL-WD [id number] Right 1 Implanted   CUP HUM [id number] +6MM OFFSET STD SHLDR POLYETH DELT XTEND - [id number]  CUP HUM [id number] +6MM OFFSET STD SHLDR POLYETH DELT XTEND  J DEPUY [employer]-WD [id number] Right 1 Implanted       JERAMY TERRAZAS JR, MD

## 2025-03-20 NOTE — ANESTHESIA PRE PROCEDURE
Department of Anesthesiology  Preprocedure Note       Name:  Gracie Farrell   Age:  60 y.o.  :  1965                                          MRN:  [id number]         Date:  3/20/2025      Surgeon: Surgeon(s):  Mckinley Wheat Jr., MD    Procedure: Procedure(s):  right reverse total shoulder arthroplasty with a delta xtend prosthesis and biceps tenodesisgeneral/interscalene. 23hr    Medications prior to admission:   Prior to Admission medications    Medication Sig Start Date End Date Taking? Authorizing Provider   HYDROcodone-acetaminophen (NORCO) 7.5-325 MG per tablet Take 1 tablet by mouth every 8 hours as needed. 3/31/17  Yes Automatic Reconciliation, Ar   omeprazole (PRILOSEC OTC) 20 MG tablet Take 1 tablet by mouth daily 17  Yes Automatic Reconciliation, Ar   Vitamin E 400 units TABS Take 800 Int'l Units by mouth   Yes Automatic Reconciliation, Ar   oxyCODONE HCl (OXY-IR) 10 MG immediate release tablet Take 1 tablet by mouth every 6 hours as needed for Pain for up to 7 days. Max Daily Amount: 40 mg 3/19/25 3/26/25  Postjessie, Mckinley YADAV Jr., MD   naloxone 4 MG/0.1ML LIQD nasal spray 1 spray by Nasal route as needed for Opioid Reversal 3/19/25   Posta, Mckinley YADAV Jr., MD   promethazine (PHENERGAN) 25 MG tablet Take 1 tablet by mouth every 6 hours as needed for Nausea 3/19/25   Posta, Mckinley YADAV Jr., MD   ketorolac (TORADOL) 10 MG tablet Take 1 tablet every 6 hours for 5 days post-op 3/19/25   Jarret, Mckinley YADAV Jr., MD   meloxicam (MOBIC) 15 MG tablet Take 1 tablet by mouth daily 3/6/25 4/5/25  Mckinley Wehat Jr., MD   meloxicam (MOBIC) 15 MG tablet Take 1 tablet by mouth daily 2/5/25 3/7/25  Postjessie, Mckinley YADAV Jr., MD       Current medications:    Current Facility-Administered Medications   Medication Dose Route Frequency Provider Last Rate Last Admin   • fentaNYL (SUBLIMAZE) injection 100 mcg  100 mcg IntraVENous Once PRN OLIVIA Mott MD       • lactated ringers infusion   IntraVENous Continuous OLIVIA Mott

## 2025-03-20 NOTE — PROGRESS NOTES
Oximeter on alarms set and functioning  iS x 10 @ 750 cc/sec   encouraged pt. To  us iS and cough and deep breath. Need of Airvo also explained

## 2025-03-20 NOTE — H&P
Update History & Physical    The patient's History and Physical of February 5, 2025 was reviewed with the patient and I examined the patient. There was no change. The surgical site was confirmed by the patient and me.       Plan: The risks, benefits, expected outcome, and alternative to the recommended procedure have been discussed with the patient. Patient understands and wants to proceed with the procedure.     Electronically signed by JERAMY TERRAZAS JR, MD on 3/20/2025 at 6:14 AM

## 2025-03-21 VITALS
HEART RATE: 69 BPM | TEMPERATURE: 98 F | BODY MASS INDEX: 19.03 KG/M2 | WEIGHT: 107.4 LBS | DIASTOLIC BLOOD PRESSURE: 160 MMHG | OXYGEN SATURATION: 96 % | SYSTOLIC BLOOD PRESSURE: 177 MMHG | HEIGHT: 63 IN | RESPIRATION RATE: 17 BRPM

## 2025-03-21 DIAGNOSIS — M12.811 ROTATOR CUFF TEAR ARTHROPATHY OF RIGHT SHOULDER: Primary | ICD-10-CM

## 2025-03-21 DIAGNOSIS — M75.101 ROTATOR CUFF TEAR ARTHROPATHY OF RIGHT SHOULDER: Primary | ICD-10-CM

## 2025-03-21 LAB
ANION GAP SERPL CALC-SCNC: 9 MMOL/L (ref 7–16)
BUN SERPL-MCNC: 12 MG/DL (ref 8–23)
CALCIUM SERPL-MCNC: 9.2 MG/DL (ref 8.8–10.2)
CHLORIDE SERPL-SCNC: 100 MMOL/L (ref 98–107)
CO2 SERPL-SCNC: 26 MMOL/L (ref 20–29)
CREAT SERPL-MCNC: 0.69 MG/DL (ref 0.6–1.1)
ERYTHROCYTE [DISTWIDTH] IN BLOOD BY AUTOMATED COUNT: 14.1 % (ref 11.9–14.6)
GLUCOSE SERPL-MCNC: 127 MG/DL (ref 70–99)
HCT VFR BLD AUTO: 32.3 % (ref 35.8–46.3)
HGB BLD-MCNC: 10.6 G/DL (ref 11.7–15.4)
MAGNESIUM SERPL-MCNC: 1.8 MG/DL (ref 1.8–2.4)
MCH RBC QN AUTO: 27.4 PG (ref 26.1–32.9)
MCHC RBC AUTO-ENTMCNC: 32.8 G/DL (ref 31.4–35)
MCV RBC AUTO: 83.5 FL (ref 82–102)
NRBC # BLD: 0 K/UL (ref 0–0.2)
PLATELET # BLD AUTO: 293 K/UL (ref 150–450)
PMV BLD AUTO: 8.5 FL (ref 9.4–12.3)
POTASSIUM SERPL-SCNC: 4.1 MMOL/L (ref 3.5–5.1)
RBC # BLD AUTO: 3.87 M/UL (ref 4.05–5.2)
SODIUM SERPL-SCNC: 135 MMOL/L (ref 136–145)
WBC # BLD AUTO: 9.5 K/UL (ref 4.3–11.1)

## 2025-03-21 PROCEDURE — 80048 BASIC METABOLIC PNL TOTAL CA: CPT

## 2025-03-21 PROCEDURE — G0378 HOSPITAL OBSERVATION PER HR: HCPCS

## 2025-03-21 PROCEDURE — 36415 COLL VENOUS BLD VENIPUNCTURE: CPT

## 2025-03-21 PROCEDURE — 2700000000 HC OXYGEN THERAPY PER DAY

## 2025-03-21 PROCEDURE — 97165 OT EVAL LOW COMPLEX 30 MIN: CPT

## 2025-03-21 PROCEDURE — 83735 ASSAY OF MAGNESIUM: CPT

## 2025-03-21 PROCEDURE — 85027 COMPLETE CBC AUTOMATED: CPT

## 2025-03-21 PROCEDURE — 6360000002 HC RX W HCPCS: Performed by: ORTHOPAEDIC SURGERY

## 2025-03-21 PROCEDURE — 97161 PT EVAL LOW COMPLEX 20 MIN: CPT

## 2025-03-21 PROCEDURE — 97535 SELF CARE MNGMENT TRAINING: CPT

## 2025-03-21 PROCEDURE — 94761 N-INVAS EAR/PLS OXIMETRY MLT: CPT

## 2025-03-21 PROCEDURE — 2500000003 HC RX 250 WO HCPCS: Performed by: ORTHOPAEDIC SURGERY

## 2025-03-21 PROCEDURE — 97530 THERAPEUTIC ACTIVITIES: CPT

## 2025-03-21 PROCEDURE — 6370000000 HC RX 637 (ALT 250 FOR IP): Performed by: ORTHOPAEDIC SURGERY

## 2025-03-21 RX ORDER — HYDROCODONE BITARTRATE AND ACETAMINOPHEN 10; 325 MG/1; MG/1
1 TABLET ORAL EVERY 6 HOURS PRN
Qty: 28 TABLET | Refills: 0 | Status: SHIPPED | OUTPATIENT
Start: 2025-03-21 | End: 2025-03-28

## 2025-03-21 RX ORDER — HYDROCODONE BITARTRATE AND ACETAMINOPHEN 10; 325 MG/1; MG/1
1 TABLET ORAL EVERY 6 HOURS PRN
Refills: 0 | Status: DISCONTINUED | OUTPATIENT
Start: 2025-03-21 | End: 2025-03-21 | Stop reason: HOSPADM

## 2025-03-21 RX ADMIN — DOCUSATE SODIUM 100 MG: 100 CAPSULE, LIQUID FILLED ORAL at 08:43

## 2025-03-21 RX ADMIN — OXYCODONE 20 MG: 5 TABLET ORAL at 08:43

## 2025-03-21 RX ADMIN — OXYCODONE 20 MG: 5 TABLET ORAL at 04:28

## 2025-03-21 RX ADMIN — CHOLECALCIFEROL TAB 125 MCG (5000 UNIT) 5000 UNITS: 125 TAB at 08:43

## 2025-03-21 RX ADMIN — WATER 1000 MG: 1 INJECTION INTRAMUSCULAR; INTRAVENOUS; SUBCUTANEOUS at 11:32

## 2025-03-21 RX ADMIN — HYDROCODONE BITARTRATE AND ACETAMINOPHEN 1 TABLET: 10; 325 TABLET ORAL at 11:31

## 2025-03-21 RX ADMIN — FERROUS SULFATE TAB 325 MG (65 MG ELEMENTAL FE) 325 MG: 325 (65 FE) TAB at 08:43

## 2025-03-21 RX ADMIN — WATER 1000 MG: 1 INJECTION INTRAMUSCULAR; INTRAVENOUS; SUBCUTANEOUS at 02:37

## 2025-03-21 RX ADMIN — OXYCODONE 10 MG: 5 TABLET ORAL at 00:40

## 2025-03-21 ASSESSMENT — PAIN DESCRIPTION - PAIN TYPE
TYPE: SURGICAL PAIN;ACUTE PAIN
TYPE: ACUTE PAIN;SURGICAL PAIN

## 2025-03-21 ASSESSMENT — PAIN - FUNCTIONAL ASSESSMENT
PAIN_FUNCTIONAL_ASSESSMENT: ACTIVITIES ARE NOT PREVENTED

## 2025-03-21 ASSESSMENT — PAIN DESCRIPTION - ORIENTATION
ORIENTATION: RIGHT

## 2025-03-21 ASSESSMENT — PAIN DESCRIPTION - LOCATION
LOCATION: SHOULDER

## 2025-03-21 ASSESSMENT — PAIN SCALES - GENERAL
PAINLEVEL_OUTOF10: 4
PAINLEVEL_OUTOF10: 6
PAINLEVEL_OUTOF10: 8
PAINLEVEL_OUTOF10: 6
PAINLEVEL_OUTOF10: 6
PAINLEVEL_OUTOF10: 2
PAINLEVEL_OUTOF10: 2
PAINLEVEL_OUTOF10: 7

## 2025-03-21 ASSESSMENT — PAIN DESCRIPTION - DESCRIPTORS
DESCRIPTORS: THROBBING
DESCRIPTORS: ACHING
DESCRIPTORS: ACHING
DESCRIPTORS: THROBBING

## 2025-03-21 ASSESSMENT — PAIN DESCRIPTION - FREQUENCY
FREQUENCY: CONTINUOUS
FREQUENCY: CONTINUOUS

## 2025-03-21 ASSESSMENT — PAIN DESCRIPTION - ONSET
ONSET: SUDDEN
ONSET: ON-GOING

## 2025-03-21 NOTE — PLAN OF CARE
Problem: Pain  Goal: Verbalizes/displays adequate comfort level or baseline comfort level  Outcome: Progressing  Flowsheets  Taken 3/20/2025 1428 by Tamar Li RN  Verbalizes/displays adequate comfort level or baseline comfort level:   Encourage patient to monitor pain and request assistance   Assess pain using appropriate pain scale   Administer analgesics based on type and severity of pain and evaluate response  Taken 3/20/2025 1418 by Tamar Li RN  Verbalizes/displays adequate comfort level or baseline comfort level: Assess pain using appropriate pain scale  Taken 3/20/2025 1413 by Tamar Li, NADINE  Verbalizes/displays adequate comfort level or baseline comfort level: Assess pain using appropriate pain scale     Problem: Safety - Adult  Goal: Free from fall injury  Outcome: Progressing     Problem: Discharge Planning  Goal: Discharge to home or other facility with appropriate resources  Outcome: Progressing

## 2025-03-21 NOTE — PROGRESS NOTES
[] [] [] [] [] []    Stand to Sit [] [] [x] [] [] [] [] [] [] [] []    Tub/Shower [] [] [] [] [] [] [] [] [] [] []       Toilet [] [] [] [] [] [] [] [] [] [] []        [] [] [] [] [] [] [] [] [] [] []    I=Independent, Mod I=Modified Independent, S=Supervision/Setup, SBA=Standby Assistance, CGA=Contact Guard Assistance, Min=Minimal Assistance, Mod=Moderate Assistance, Max=Maximal Assistance, Total=Total Assistance, NT=Not Tested    ACTIVITIES OF DAILY LIVING: I Mod I S SBA CGA Min Mod Max Total NT Comments   BASIC ADLs:              Upper Body Bathing [] [] [] [] [] [] [] [] [] []    Lower Body Bathing [] [] [] [] [] [] [] [] [] []    Toileting [] [] [] [] [] [] [] [] [] []    Upper Body Dressing [] [] [] [] [] [x] [] [] [] []    Lower Body Dressing [] [] [] [x] [] [] [] [] [] []    Feeding [] [] [] [] [] [] [] [] [] []    Grooming [] [] [x] [] [] [] [] [] [] []    Personal Device Care [] [] [] [] [] [] [] [] [] []    Functional Mobility [] [] [] [x] [] [] [] [] [] []    I=Independent, Mod I=Modified Independent, S=Supervision/Setup, SBA=Standby Assistance, CGA=Contact Guard Assistance, Min=Minimal Assistance, Mod=Moderate Assistance, Max=Maximal Assistance, Total=Total Assistance, NT=Not Tested    PLAN:     FREQUENCY/DURATION   OT Plan of Care: 2 times/week, 1 time/week for duration of hospital stay or until stated goals are met, whichever comes first.    ACUTE OCCUPATIONAL THERAPY GOALS:   (Developed with and agreed upon by patient and/or caregiver.)    GOALS:   DISCHARGE GOALS (in preparation for going home/rehab):  3 days  Ms. Javon Farrell will perform dressing activity to include donning and doffing sling with min A and riya-technique to demonstrate improved functional mobility and safety. -GOAL MET 3/21/25    Ms. Javon Farrell will perform bathing activity with min A and riya-technique to demonstrate improved functional mobility and safety.  Ms. Javon Farrell will perform toileting/toilet transfer with  [x] Self Care Training and Home Safety  [] Walker Management/Safety  [] Adaptive Equipment as Needed  [] Therapeutic Resting Position of Joint     TOTAL TREATMENT DURATION AND TIME:  Time In: 1020  Time Out: 1048  Minutes: 28    Tere Orozco, OT

## 2025-03-21 NOTE — PROGRESS NOTES
ACUTE PHYSICAL THERAPY GOALS:   (Developed with and agreed upon by patient and/or caregiver.)  GOALS (1-4 days):  (1.)  Patient will move from supine to sit and sit to supine  in bed with INDEPENDENT.    (2.)  Patient will transfer from bed to chair and chair to bed with INDEPENDENT using the least restrictive device.    (3.)  Patient will ambulate with SUPERVISION for 200 feet with the least restrictive device.   (4.)  Patient will be independent with shoulder HEP to increase range of motion per MD orders.  ________________________________________________________________________________________________     PHYSICAL THERAPY: SHOULDER Initial Assessment and AM  (Link to Caseload Tracking: PT Visit Days : 1  Acknowledge Orders Time In/Out  PT Charge Capture  Rehab Caseload Tracker    Gracie Farrell is a 60 y.o. female   PRIMARY DIAGNOSIS: Rotator cuff tear arthropathy of right shoulder  Rotator cuff tear arthropathy of right shoulder [M75.101, M12.811]  Procedure(s) (LRB):  REVERSE RIGHT TOTAL SHOULDER ARTHROPLASTY WITH DELTA EXTEND PROSTHESIS, BICEPS TENODESIS (Right)  1 Day Post-Op  Reason for Referral: Pain in Right Shoulder (M25.511)  Stiffness of Right Shoulder, Not elsewhere classified (M25.611)  Other abnormalities of gait and mobility (R26.89)  Observation: Payor: ALEYDA MUNGUIA / Plan: [employer] [place] / Product Type: *No Product type* /     MOVEMENT PRECAUTIONS   TWICE A DAY   Active and passive range of motion right elbow hand   Active assisted and passive range of motion right Shoulder to tolerance   Pulleys and pendulums   Do not push motion   nwb right shoulder   wbat bilateral lower extremity   Sling right shoulde      REHAB RECOMMENDATIONS:   Recommendation to date pending progress:  Setting:  Outpatient Therapy    Equipment:    None     ASSESSMENT:   ASSESSMENT:  Ms. Javon Farrell admitted for right Reverse TSA and will benefit from PT to increase her functional independence with HEP and bed  DOMINANCE:  Left []  Right []      UPPER EXTREMITY GROSS EVALUATION:   RIGHT UE   Within Functional Limits   Abnormal   Comments   Strength [] [x]     Active Range of Motion [] [x]     Passive Range of Motion           [] [x]        LEFT UE Within Functional Limits   Abnormal   Comments   Strength [x] []     Active Range of Motion [x] []     Passive Range of Motion [x] []       LOWER EXTREMITY GROSS EVALUATION:     Within Functional Limits   Abnormal   Comments   Strength [] [x]    Range of Motion [] [x]        COGNITION/  PERCEPTION: Intact Impaired (Comments):   Orientation [x]     Vision [x]     Hearing [x]     Cognition  [x]       MOBILITY: I Mod I S SBA CGA Min Mod Max Total  NT x2 Comments:   Bed Mobility    Rolling [] [] [] [] [] [] [] [] [] [x] []    Supine to Sit [] [] [] [] [] [] [] [] [] [x] []    Scooting [x] [] [] [] [] [] [] [] [] [] []    Sit to Supine [] [] [] [] [] [] [] [] [] [] []    Transfers    Sit to Stand [x] [] [] [] [] [] [] [] [] [] []    Bed to Chair [x] [] [] [] [] [] [] [] [] [] []    Stand to Sit [x] [] [] [] [] [] [] [] [] [] []    Stand Pivot [] [] [] [] [] [] [] [] [] [] []    Toilet [] [] [] [] [] [] [] [] [] [] []     [] [] [] [] [] [] [] [] [] [] []    I=Independent, Mod I=Modified Independent, S=Supervision, SBA=Standby Assistance, CGA=Contact Guard Assistance,   Min=Minimal Assistance, Mod=Moderate Assistance, Max=Maximal Assistance, Total=Total Assistance, NT=Not Tested    GAIT: I Mod I S SBA CGA Min Mod Max Total  NT x2 Comments:   Level of Assistance [] [] [x] [] [] [] [] [] [] [] []    Weightbearing Status  Restrictions/Precautions  Restrictions/Precautions: Weight Bearing    Distance  400 feet    Gait Quality Decreased rudy  and Narrow base of support    DME None     Stairs      I=Independent, Mod I=Modified Independent, S=Supervision, SBA=Standby Assistance, CGA=Contact Guard Assistance,   Min=Minimal Assistance, Mod=Moderate Assistance, Max=Maximal Assistance,

## 2025-03-21 NOTE — PROGRESS NOTES
Orthopedic Joint Progress Note    2025  Admit Date: 3/20/2025  Admit Diagnosis: Rotator cuff tear arthropathy of right shoulder [M75.101, M12.811]    1 Day Post-Op    Subjective: some pain ready to go home     Gracie Farrell     Review of Systems: pertinent items are noted in HPI    Objective:     PT/OT:     PATIENT MOBILITY                           Vital Signs:    Blood pressure 108/72, pulse 77, temperature 97.3 °F (36.3 °C), temperature source Oral, resp. rate 16, height 1.6 m (5' 2.99\"), weight 48.7 kg (107 lb 6.4 oz), SpO2 94%.  Temp (24hrs), Av.5 °F (36.4 °C), Min:97.1 °F (36.2 °C), Max:97.9 °F (36.6 °C)      Pain Control:        Meds:  Current Facility-Administered Medications   Medication Dose Route Frequency    scopolamine (TRANSDERM-SCOP) transdermal patch 1 patch  1 patch TransDERmal Once    HYDROmorphone HCl PF (DILAUDID) injection 1 mg  1 mg IntraVENous Q2H PRN    docusate sodium (COLACE) capsule 100 mg  100 mg Oral BID    bisacodyl (DULCOLAX) suppository 10 mg  10 mg Rectal Daily PRN    ferrous sulfate (IRON 325) tablet 325 mg  325 mg Oral BID WC    sodium phosphate (FLEET) rectal enema 1 enema  1 enema Rectal Once PRN    promethazine (PHENERGAN) tablet 25 mg  25 mg Oral Q4H PRN    ondansetron (ZOFRAN-ODT) disintegrating tablet 4 mg  4 mg Oral Q8H PRN    sodium chloride flush 0.9 % injection 5-40 mL  5-40 mL IntraVENous 2 times per day    sodium chloride flush 0.9 % injection 5-40 mL  5-40 mL IntraVENous PRN    0.9 % sodium chloride infusion   IntraVENous PRN    oxyCODONE (ROXICODONE) immediate release tablet 10 mg  10 mg Oral Q3H PRN    oxyCODONE (ROXICODONE) immediate release tablet 20 mg  20 mg Oral Q3H PRN    ceFAZolin (ANCEF) 1,000 mg in sterile water 10 mL IV syringe  1,000 mg IntraVENous Q8H    vitamin D3 (CHOLECALCIFEROL) tablet 5,000 Units  5,000 Units Oral Daily        LAB:    Lab Results   Component Value Date/Time    INR 1.0 2025 02:02 PM     Lab Results    Component Value Date/Time    HGB 10.6 03/21/2025 03:18 AM    HGB 13.7 03/06/2025 02:02 PM    HGB 11.3 01/06/2023 10:59 PM              Physical Exam:  No significant changes    Assessment:      Principal Problem:    Rotator cuff tear arthropathy of right shoulder  Active Problems:    Bicipital tendinitis of right shoulder  Resolved Problems:    * No resolved hospital problems. *       Plan:     Continue PT/OT/Rehab  Discharge home         JERAMY TERRAZAS JR, MD    I have reviewed the patient’s controlled substance prescription history, as maintained in the [place] prescription monitoring program, so that the prescription(s) for a  controlled substance can be given.

## 2025-03-21 NOTE — CARE COORDINATION
Patient plans to return home on dc and will f/u with Dr. Wheat who will arrange outpatient therapy when appropriate.  No immediate d/c planning needs identified.       03/21/25 0811   Service Assessment   Patient Orientation Alert and Oriented   Cognition Alert   History Provided By Medical Record   Primary Caregiver Self   Services At/After Discharge   Services At/After Discharge None   Mode of Transport at Discharge Self   Confirm Follow Up Transport Self

## 2025-03-21 NOTE — DISCHARGE SUMMARY
[facility] & [address]  [address]                            DISCHARGE SUMMARY      PATIENT NAME: DIO CAVANAUGH     : 1965  MED REC NO: [id number]                       ROOM: [address] [place] [address] NO: [id number]                       ADMIT DATE: 2025  PROVIDER: Mckinley Wheat Jr, MD    DISCHARGE DATE:  2025    ATTENDING PHYSICIAN:  Mckinley Wheat Jr, MD    ADMISSION DIAGNOSES:    1. Rotator cuff tear arthropathy, right shoulder.  2. Biceps tendinitis, right shoulder.    DISCHARGE DIAGNOSES:    1. Rotator cuff tear arthropathy, right shoulder.  2. Biceps tendinitis, right shoulder.    Please see H and P, operative summaries, and consult for details.    HOSPITAL COURSE:  The patient is a 60-year-old female who was admitted on 2025, underwent uncomplicated reverse right total shoulder arthroplasty with Delta Xtend prosthesis, biceps tenodesis.  Immediately perioperatively, her vitamin D levels were 16.6 and were repleted.  On postoperative day #1, she was afebrile, vital signs were stable, her labs were within normal limits, and her pain was controlled.  She was discharged home on postoperative day #1.  She will continue therapy on the outside and follow up in my office in 2 weeks.        MCKINLEY WHEAT JR, MD      AGP/AQS  D:  2025 16:54:41  T:  2025 18:43:24  JOB #:  [id number]    CC:   Mckinley Wheat Jr, MD

## 2025-03-21 NOTE — DISCHARGE INSTRUCTIONS
link.  Current as of: July 1, 2021               Content Version: 13.2  © 8626-6849 [employer].   Care instructions adapted under license by [employer]. If you have questions about a medical condition or this instruction, always ask your healthcare professional. [employer] disclaims any warranty or liability for your use of this information.

## 2025-04-02 ENCOUNTER — TELEPHONE (OUTPATIENT)
Dept: ORTHOPEDIC SURGERY | Age: 60
End: 2025-04-02

## 2025-04-02 ENCOUNTER — OFFICE VISIT (OUTPATIENT)
Dept: ORTHOPEDIC SURGERY | Age: 60
End: 2025-04-02

## 2025-04-02 DIAGNOSIS — Z47.1 AFTERCARE FOLLOWING RIGHT SHOULDER JOINT REPLACEMENT SURGERY: ICD-10-CM

## 2025-04-02 DIAGNOSIS — Z96.611 AFTERCARE FOLLOWING RIGHT SHOULDER JOINT REPLACEMENT SURGERY: ICD-10-CM

## 2025-04-02 DIAGNOSIS — Z96.611 PRESENCE OF RIGHT ARTIFICIAL SHOULDER JOINT: Primary | ICD-10-CM

## 2025-04-02 DIAGNOSIS — Z76.89 ENCOUNTER TO ESTABLISH CARE: ICD-10-CM

## 2025-04-02 PROCEDURE — 99024 POSTOP FOLLOW-UP VISIT: CPT | Performed by: ORTHOPAEDIC SURGERY

## 2025-04-02 RX ORDER — MELOXICAM 15 MG/1
15 TABLET ORAL DAILY
Qty: 30 TABLET | Refills: 0 | Status: SHIPPED | OUTPATIENT
Start: 2025-04-02 | End: 2025-05-02

## 2025-04-02 RX ORDER — MELOXICAM 15 MG/1
15 TABLET ORAL DAILY
Qty: 30 TABLET | Refills: 3 | Status: SHIPPED | OUTPATIENT
Start: 2025-04-02 | End: 2025-05-02

## 2025-04-02 RX ORDER — HYDROCODONE BITARTRATE AND ACETAMINOPHEN 10; 325 MG/1; MG/1
1 TABLET ORAL EVERY 6 HOURS PRN
Qty: 28 TABLET | Refills: 0 | Status: SHIPPED | OUTPATIENT
Start: 2025-04-02 | End: 2025-04-09

## 2025-04-02 NOTE — TELEPHONE ENCOUNTER
Her prescriptions were sent to the wrong pharmacy. Please send to [facility] since [facility] doesn't accept her insurance. [facility] in [place].

## 2025-04-02 NOTE — TELEPHONE ENCOUNTER
Rx at [facility] cancelled. New rx routed to [facility] for him to sign.

## 2025-04-02 NOTE — PROGRESS NOTES
Name: Gracie Farrell  YOB: 1965  Gender: female  MRN: [id number]            HPI: Gracie Farrell is a 60 y.o. right-hand-dominant female 2 weeks status post reverse right total shoulder arthroplasty with a delta xtend prosthesis biceps tenodesis.  She returns and is doing well      ROS/Meds/PSH/PMH/FH/SH: A ten system review of systems was performed and is negative other than what is in the HPI.   Tobacco:  reports that she has been smoking cigarettes. She started smoking about 51 years ago. She has a 51.2 pack-year smoking history. She has never used smokeless tobacco.  There were no vitals taken for this visit.     Physical Examination:  She is an awake alert pleasant female ambulating without difficulty  She has restricted range of cervical spine motion without radicular findings    The left shoulder has 0 to 180 degrees of active and 0 to 180 degrees passive forward elevation.   Internal rotation is to T6.  External rotation is to 60 degrees at the side.   In the 90 degree abducted position 90 degrees of external and 90 degrees internal rotation  The AC joint is non-tender  SC joint is non-tender.   Greater tuberosity is non-tender.  negative biceps  Negative O'Briens sign  negative lift-off sign  Negative belly press sign  Negative bear huggers sign  negative drop sign  negative hornblower's sign  No posterior glenohumeral joint line tenderness.   No evident excessive external rotation  Rotator cuff strength is 5/5.  negative external rotation stress test.   Negative empty can sign  There is no evident anterior or posterior apprehension with a negative sulcus sign.   No instability  negative external and internal Rotation lag sign  Neurovascularly intact.    The right shoulder deltopectoral incision has healed  The suture was removed  Active forward elevation is 0-60  Passively goes 0-90  ER to 30  Biceps has good cosmetic appearance  She is neurovascularly

## 2025-04-03 ENCOUNTER — HOSPITAL ENCOUNTER (OUTPATIENT)
Dept: PHYSICAL THERAPY | Age: 60
Setting detail: RECURRING SERIES
Discharge: HOME | End: 2025-04-06
Attending: ORTHOPAEDIC SURGERY
Payer: COMMERCIAL

## 2025-04-03 DIAGNOSIS — Z47.1 AFTERCARE FOLLOWING RIGHT SHOULDER JOINT REPLACEMENT SURGERY: Primary | ICD-10-CM

## 2025-04-03 DIAGNOSIS — Z96.611 AFTERCARE FOLLOWING RIGHT SHOULDER JOINT REPLACEMENT SURGERY: Primary | ICD-10-CM

## 2025-04-03 DIAGNOSIS — G89.29 CHRONIC RIGHT SHOULDER PAIN: ICD-10-CM

## 2025-04-03 DIAGNOSIS — M25.511 CHRONIC RIGHT SHOULDER PAIN: ICD-10-CM

## 2025-04-03 PROCEDURE — 97110 THERAPEUTIC EXERCISES: CPT

## 2025-04-03 PROCEDURE — 97161 PT EVAL LOW COMPLEX 20 MIN: CPT

## 2025-04-03 ASSESSMENT — PAIN DESCRIPTION - ORIENTATION: ORIENTATION: RIGHT

## 2025-04-03 ASSESSMENT — PAIN SCALES - GENERAL: PAINLEVEL_OUTOF10: 4

## 2025-04-03 ASSESSMENT — PAIN DESCRIPTION - LOCATION: LOCATION: SHOULDER

## 2025-04-03 NOTE — THERAPY EVALUATION
Graciejl Farrell  : 1965  Primary: Florence Antoine Sc (Lynnette MUNGUIA)  Secondary:  [facility] on [address]  [place] [address]  Phone: [phone]  Fax: [phone] Plan Frequency: 2x per week    Plan of Care/Certification Expiration Date: 25        Plan of Care/Certification Expiration Date:  Plan of Care/Certification Expiration Date: 25    Frequency/Duration:  Plan Frequency: 2x per week       Time In/Out:   Time In: 1434  Time Out: 1504     PT Visit Info:    Plan Frequency: 2x per week  Total # of Visits Approved: 30  Total # of Visits to Date: 1  Progress Note Counter: 1      Visit Count: 1                OUTPATIENT PHYSICAL THERAPY:             Initial Assessment 4/3/2025                 Episode (R reverse TSA)         Treatment Diagnosis:   Aftercare following right shoulder joint replacement surgery  Chronic right shoulder pain  Medical/Referring Diagnosis:    Presence of right artificial shoulder joint [Z96.611]  Aftercare following right shoulder joint replacement surgery [Z47.1, Z96.611]  Referring Physician: Mckinley Wheat Jr., MD MD Orders: PT Eval and Treat   Return MD Appt: 25  Date of Onset: 25 (DOS)  Allergies: Bee venom, Sulfa antibiotics, and Aspirin  Restrictions/Precautions:    Post Surgical Precautions: Delta protocol      Medications Last Reviewed: 4/3/2025     SUBJECTIVE   History of Injury/Illness (Reason for Referral):  Ms. Farrell presents to outpatient PT s/p R shoulder surgery. She underwent a reverse right total shoulder arthroplasty with a Delta Xtend prosthesis and biceps tenodesis on 25 to address rotator cuff arthropathy and bicep tendinitis. She reports a history of R rotator cuff surgery in  by Dr. Wheat. She lives alone and cares for 20 animals. She's had someone come to her house every so often to provide assistance with ADLs and household chores since she is right-handed. She suffered a dog

## 2025-04-03 NOTE — PROGRESS NOTES
Gracie Alejandro [place]  : 1965  Primary: Florence Antoine Sc (Lynnette MUNGUIA)  Secondary:  [facility] on [address]  [place] [address]  Phone: [phone]  Fax: [phone] Plan Frequency: 2x per week    Plan of Care/Certification Expiration Date: 25        Plan of Care/Certification Expiration Date:  Plan of Care/Certification Expiration Date: 25    Frequency/Duration:  Plan Frequency: 2x per week       Time In/Out:   Time In: 1434  Time Out: 1504      PT Visit Info:    Plan Frequency: 2x per week  Total # of Visits Approved: 30  Total # of Visits to Date: 1  Progress Note Counter: 1      Visit Count: 1    OUTPATIENT PHYSICAL THERAPY:  Treatment Note 4/3/2025       Charge Capture   Episode (R reverse TSA)               Treatment Diagnosis:   Aftercare following right shoulder joint replacement surgery  Chronic right shoulder pain  Medical/Referring Diagnosis:    Presence of right artificial shoulder joint [Z96.611]  Aftercare following right shoulder joint replacement surgery [Z47.1, Z96.611]  Referring Physician: Mckinley Wheat Jr., MD MD Orders: PT Eval and Treat  Return MD Appt: 25  Date of Onset: 25 (DOS)  Allergies: Bee venom, Sulfa antibiotics, and Aspirin  Restrictions/Precautions:   Post Surgical Precautions: Delta protocol      Interventions Planned: (Treatment may consist of any combination of the following):  Current Treatment Recommendations: Strengthening; ROM; Neuromuscular re-education; Manual; Pain management; Home exercise program; Safety education & training; Modalities; Positioning; Dry needling; Patient/Caregiver education & training; Therapeutic activities    Subjective Comments: See eval note from today.    Initial Pain Level: Right Shoulder 4/10  Post Session Pain Level: Right Shoulder 4/10    Medications Last Reviewed: 4/3/2025    Updated Objective Findings: See Evaluation Note from today    Treatment     THERAPEUTIC EXERCISE:

## 2025-04-09 ENCOUNTER — HOSPITAL ENCOUNTER (OUTPATIENT)
Dept: PHYSICAL THERAPY | Age: 60
Setting detail: RECURRING SERIES
Discharge: HOME | End: 2025-04-12
Attending: ORTHOPAEDIC SURGERY
Payer: COMMERCIAL

## 2025-04-09 PROCEDURE — 97110 THERAPEUTIC EXERCISES: CPT

## 2025-04-09 ASSESSMENT — PAIN SCALES - GENERAL: PAINLEVEL_OUTOF10: 9

## 2025-04-09 ASSESSMENT — PAIN DESCRIPTION - LOCATION: LOCATION: SHOULDER

## 2025-04-09 NOTE — PROGRESS NOTES
aid.  Communication/Consultation: None today  Equipment provided: None.   Recommendations / Intent for next treatment session: Next visit will focus on shoulder mobility and muscle activation.    >Total Treatment Billable Duration: 32 minutes  Time In: 1120  Time Out: 1152    Pepe Patten PT, DPT    Charge Capture  [employer] Portal  Appt Desk  Attendance Report     Future Appointments   Date Time Provider Department Center   4/11/2025  2:00 PM Pepe Patten, PT [facility]   4/15/2025  3:15 PM Pepe Patten, PT [facility]   4/17/2025  1:45 PM Lindsay Rain, PTA [facility]   4/21/2025  2:30 PM Pepe Patten, PT [facility]   4/23/2025  2:30 PM Pepe Patten, PT [facility]   4/28/2025  1:00 PM Lindsay Rain, PTA [facility]   4/30/2025  2:30 PM Pepe Patten, PT [facility]   5/5/2025  2:30 PM Pepe Patten, PT [facility]   5/7/2025  9:45 AM Mckinley Wheat Jr., MD [facility]   5/8/2025  1:00 PM Pepe Patten, PT [facility]   5/12/2025  2:30 PM Pepe Patten, PT [facility]   5/14/2025  1:45 PM Pepe Patten, PT [facility]   5/15/2025 10:40 AM Pepe Sorenson,  [facility]   5/19/2025  1:45 PM Pepe Patten, PT [facility]   5/21/2025  1:45 PM Pepe Patten, PT [facility]

## 2025-04-11 ENCOUNTER — HOSPITAL ENCOUNTER (OUTPATIENT)
Dept: PHYSICAL THERAPY | Age: 60
Setting detail: RECURRING SERIES
Discharge: HOME | End: 2025-04-14
Attending: ORTHOPAEDIC SURGERY
Payer: COMMERCIAL

## 2025-04-11 PROCEDURE — 97110 THERAPEUTIC EXERCISES: CPT

## 2025-04-11 ASSESSMENT — PAIN DESCRIPTION - LOCATION: LOCATION: SHOULDER

## 2025-04-11 ASSESSMENT — PAIN SCALES - GENERAL: PAINLEVEL_OUTOF10: 5

## 2025-04-11 NOTE — PROGRESS NOTES
Gracie Alejandro [place]  : 1965  Primary: Florence Antoine Sc (Lynnette MUNGUIA)  Secondary:  [facility] on [address]  [place] [address]  Phone: [phone]  Fax: [phone] Plan Frequency: 2x per week    Plan of Care/Certification Expiration Date: 25        Plan of Care/Certification Expiration Date:  Plan of Care/Certification Expiration Date: 25    Frequency/Duration:  Plan Frequency: 2x per week       Time In/Out:   Time In: 1355  Time Out: 1434      PT Visit Info:    Plan Frequency: 2x per week  Total # of Visits Approved: 30  Total # of Visits to Date: 3  Progress Note Counter: 3      Visit Count: 3    OUTPATIENT PHYSICAL THERAPY:  Treatment Note 2025       Charge Capture   Episode (R reverse TSA)               Treatment Diagnosis:   Aftercare following right shoulder joint replacement surgery  Chronic right shoulder pain  Medical/Referring Diagnosis:    Presence of right artificial shoulder joint [Z96.611]  Aftercare following right shoulder joint replacement surgery [Z47.1, Z96.611]  Referring Physician: Mckinley Wheat Jr., MD MD Orders: PT Eval and Treat  Return MD Appt: 25  Date of Onset: 25 (DOS)  Allergies: Bee venom, Sulfa antibiotics, and Aspirin  Restrictions/Precautions:   Post Surgical Precautions: Delta protocol      Interventions Planned: (Treatment may consist of any combination of the following):  Current Treatment Recommendations: Strengthening; ROM; Neuromuscular re-education; Manual; Pain management; Home exercise program; Safety education & training; Modalities; Positioning; Dry needling; Patient/Caregiver education & training; Therapeutic activities    Subjective Comments: Patient reports she's feeling a little better. She says she's not in as much pain, but still feeling some upon arrival to PT.    Initial Pain Level:   Shoulder 5/10  Post Session Pain Level:   Shoulder 5/10     Medications Last Reviewed:

## 2025-04-15 ENCOUNTER — HOSPITAL ENCOUNTER (OUTPATIENT)
Dept: PHYSICAL THERAPY | Age: 60
Setting detail: RECURRING SERIES
Discharge: HOME | End: 2025-04-18
Attending: ORTHOPAEDIC SURGERY
Payer: COMMERCIAL

## 2025-04-15 PROCEDURE — 97110 THERAPEUTIC EXERCISES: CPT

## 2025-04-15 ASSESSMENT — PAIN DESCRIPTION - LOCATION: LOCATION: SHOULDER

## 2025-04-15 ASSESSMENT — PAIN SCALES - GENERAL: PAINLEVEL_OUTOF10: 4

## 2025-04-15 NOTE — PROGRESS NOTES
Gracie Alejandro [place]  : 1965  Primary: Florence Antoine Sc (Lynnette MUNGUIA)  Secondary:  [facility] on [address]  [place] [address]  Phone: [phone]  Fax: [phone] Plan Frequency: 2x per week    Plan of Care/Certification Expiration Date: 25        Plan of Care/Certification Expiration Date:  Plan of Care/Certification Expiration Date: 25    Frequency/Duration:  Plan Frequency: 2x per week       Time In/Out:   Time In: 1517  Time Out: 1555      PT Visit Info:    Plan Frequency: 2x per week  Total # of Visits Approved: 30  Total # of Visits to Date: 4  Progress Note Counter: 4      Visit Count: 4    OUTPATIENT PHYSICAL THERAPY:  Treatment Note 4/15/2025       Charge Capture   Episode (R reverse TSA)               Treatment Diagnosis:   Aftercare following right shoulder joint replacement surgery  Chronic right shoulder pain  Medical/Referring Diagnosis:    Presence of right artificial shoulder joint [Z96.611]  Aftercare following right shoulder joint replacement surgery [Z47.1, Z96.611]  Referring Physician: Mckinley Wheat Jr., MD MD Orders: PT Eval and Treat  Return MD Appt: 25  Date of Onset: 25 (DOS)  Allergies: Bee venom, Sulfa antibiotics, and Aspirin  Restrictions/Precautions:   Post Surgical Precautions: Delta protocol      Interventions Planned: (Treatment may consist of any combination of the following):  Current Treatment Recommendations: Strengthening; ROM; Neuromuscular re-education; Manual; Pain management; Home exercise program; Safety education & training; Modalities; Positioning; Dry needling; Patient/Caregiver education & training; Therapeutic activities    Subjective Comments: Patient reports she's having some pain today. She brings in her pulleys today to see if they can be adjusted as she's had difficulty with operating them at home.    Initial Pain Level:   Shoulder 4/10  Post Session Pain Level:   Shoulder 4/10

## 2025-04-17 ENCOUNTER — HOSPITAL ENCOUNTER (OUTPATIENT)
Dept: PHYSICAL THERAPY | Age: 60
Setting detail: RECURRING SERIES
Discharge: HOME | End: 2025-04-20
Attending: ORTHOPAEDIC SURGERY
Payer: COMMERCIAL

## 2025-04-17 PROCEDURE — 97110 THERAPEUTIC EXERCISES: CPT

## 2025-04-17 PROCEDURE — 97140 MANUAL THERAPY 1/> REGIONS: CPT

## 2025-04-17 NOTE — PROGRESS NOTES
HEP:  - Standing 'L' Stretch at Counter  - 2-3 x daily - 7 x weekly - 1 sets - 10-20 reps  - Supine Shoulder External Rotation with Dowel  - 2 x daily - 7 x weekly - 1 sets - 10-15 reps  - Circular Shoulder Pendulum with Table Support  - 3 x daily - 7 x weekly - 30-60 seconds  - Seated Shoulder Flexion AAROM with Pulley Behind  - 2-3 x daily - 7 x weekly - 1-3 minutes  - Sidelying Shoulder Abduction  - 1 x daily - 3 x weekly - 3 sets - 10 reps  - Pulleys - 1-2 x daily - 7 x weekly - 1-3 minutes    Treatment/Session Summary:    Treatment Assessment: Patient tolerated therapy well today, but had noted trigger points/tension especially at upper trap/subscap region.  Most limited with ER, but improved during session.   Communication/Consultation: None today  Equipment provided: None.   Recommendations / Intent for next treatment session: Next visit will focus on shoulder mobility and muscle activation.    >Total Treatment Billable Duration: 45 minutes  Time In: 1350  Time Out: 1435    Lindsay Rain PTA    Charge Capture  [employer] Portal  Appt Desk  Attendance Report     Future Appointments   Date Time Provider Department Center   4/21/2025  2:30 PM Pepe Patten, PT [facility]   4/23/2025  2:30 PM Pepe Patten, PT [facility]   4/28/2025  1:00 PM Lindsay Rain, PTA [facility]   4/30/2025  2:30 PM Pepe Patten, PT [facility]   5/5/2025  2:30 PM Pepe Patten, PT [facility]   5/7/2025  9:45 AM Mckinley Wheat Jr., MD [facility]   5/8/2025  1:00 PM Pepe Patten, PT [facility]   5/12/2025  2:30 PM Pepe Patten, PT [facility]   5/14/2025  1:45 PM Pepe Patten, PT [facility]   5/15/2025 10:40 AM Pepe Sorenson DO [facility]   5/19/2025  1:45 PM Pepe Patten, PT [facility]   5/21/2025  1:45 PM Pepe Patten, PT [facility]

## 2025-04-21 ENCOUNTER — HOSPITAL ENCOUNTER (OUTPATIENT)
Dept: PHYSICAL THERAPY | Age: 60
Setting detail: RECURRING SERIES
Discharge: HOME | End: 2025-04-24
Attending: ORTHOPAEDIC SURGERY
Payer: COMMERCIAL

## 2025-04-21 PROCEDURE — 97110 THERAPEUTIC EXERCISES: CPT

## 2025-04-21 ASSESSMENT — PAIN DESCRIPTION - LOCATION: LOCATION: SHOULDER

## 2025-04-21 ASSESSMENT — PAIN SCALES - GENERAL: PAINLEVEL_OUTOF10: 4

## 2025-04-21 NOTE — PROGRESS NOTES
Gracie Alejandro [place]  : 1965  Primary: Florence Antoine Sc (Lynnette MUNGUIA)  Secondary:  [facility] on [address]  [place] [address]  Phone: [phone]  Fax: [phone] Plan Frequency: 2x per week    Plan of Care/Certification Expiration Date: 25        Plan of Care/Certification Expiration Date:  Plan of Care/Certification Expiration Date: 25    Frequency/Duration:  Plan Frequency: 2x per week       Time In/Out:   Time In: 1432  Time Out: 1511      PT Visit Info:    Plan Frequency: 2x per week  Total # of Visits Approved: 30  Total # of Visits to Date: 6  Progress Note Counter: 6      Visit Count: 6    OUTPATIENT PHYSICAL THERAPY:  Treatment Note 2025       Charge Capture   Episode (R reverse TSA)               Treatment Diagnosis:   Aftercare following right shoulder joint replacement surgery  Chronic right shoulder pain  Medical/Referring Diagnosis:    Presence of right artificial shoulder joint [Z96.611]  Aftercare following right shoulder joint replacement surgery [Z47.1, Z96.611]  Referring Physician: Mckinley Wheat Jr., MD MD Orders: PT Eval and Treat  Return MD Appt: 25  Date of Onset: 25 (DOS)  Allergies: Bee venom, Sulfa antibiotics, and Aspirin  Restrictions/Precautions:   Post Surgical Precautions: Delta protocol      Interventions Planned: (Treatment may consist of any combination of the following):  Current Treatment Recommendations: Strengthening; ROM; Neuromuscular re-education; Manual; Pain management; Home exercise program; Safety education & training; Modalities; Positioning; Dry needling; Patient/Caregiver education & training; Therapeutic activities    Subjective Comments: Patient reports she had a pretty good weekend, but thinks she may have done too much. She says she's having some pain this afternoon.     Initial Pain Level:   Shoulder 4/10  Post Session Pain Level:   Shoulder 4/10     Medications Last Reviewed:

## 2025-04-23 ENCOUNTER — HOSPITAL ENCOUNTER (OUTPATIENT)
Dept: PHYSICAL THERAPY | Age: 60
Setting detail: RECURRING SERIES
Discharge: HOME | End: 2025-04-26
Attending: ORTHOPAEDIC SURGERY
Payer: COMMERCIAL

## 2025-04-23 PROCEDURE — 97110 THERAPEUTIC EXERCISES: CPT

## 2025-04-23 ASSESSMENT — PAIN DESCRIPTION - LOCATION: LOCATION: SHOULDER

## 2025-04-23 ASSESSMENT — PAIN SCALES - GENERAL: PAINLEVEL_OUTOF10: 5

## 2025-04-23 NOTE — PROGRESS NOTES
Gracie Alejandro [place]  : 1965  Primary: Florence Antoine Sc (Lynnette MUNGUIA)  Secondary:  [facility] on [address]  [place] [address]  Phone: [phone]  Fax: [phone] Plan Frequency: 2x per week    Plan of Care/Certification Expiration Date: 25        Plan of Care/Certification Expiration Date:  Plan of Care/Certification Expiration Date: 25    Frequency/Duration:  Plan Frequency: 2x per week       Time In/Out:   Time In: 1432  Time Out: 1517      PT Visit Info:    Plan Frequency: 2x per week  Total # of Visits Approved: 30  Total # of Visits to Date: 7  Progress Note Counter: 7      Visit Count: 7    OUTPATIENT PHYSICAL THERAPY:  Treatment Note 2025       Charge Capture   Episode (R reverse TSA)               Treatment Diagnosis:   Aftercare following right shoulder joint replacement surgery  Chronic right shoulder pain  Medical/Referring Diagnosis:    Presence of right artificial shoulder joint [Z96.611]  Aftercare following right shoulder joint replacement surgery [Z47.1, Z96.611]  Referring Physician: Mckinley Wheat Jr., MD MD Orders: PT Eval and Treat  Return MD Appt: 25  Date of Onset: 25 (DOS)  Allergies: Bee venom, Sulfa antibiotics, and Aspirin  Restrictions/Precautions:   Post Surgical Precautions: Delta protocol      Interventions Planned: (Treatment may consist of any combination of the following):  Current Treatment Recommendations: Strengthening; ROM; Neuromuscular re-education; Manual; Pain management; Home exercise program; Safety education & training; Modalities; Positioning; Dry needling; Patient/Caregiver education & training; Therapeutic activities    Subjective Comments: Patient reports her shoulder is feeling miserable this afternoon.     Initial Pain Level:   Shoulder 5/10  Post Session Pain Level:   Shoulder 5/10     Medications Last Reviewed: 2025    Updated Objective Findings: None Today    Treatment

## 2025-04-28 ENCOUNTER — HOSPITAL ENCOUNTER (OUTPATIENT)
Dept: PHYSICAL THERAPY | Age: 60
Setting detail: RECURRING SERIES
Discharge: HOME | End: 2025-05-01
Attending: ORTHOPAEDIC SURGERY
Payer: COMMERCIAL

## 2025-04-28 PROCEDURE — 97140 MANUAL THERAPY 1/> REGIONS: CPT

## 2025-04-28 PROCEDURE — 97110 THERAPEUTIC EXERCISES: CPT

## 2025-04-28 NOTE — PROGRESS NOTES
with activities like being on her phone.      Initial Pain Level:      3/10  Post Session Pain Level:      3-4/10     Medications Last Reviewed: 4/28/2025    Updated Objective Findings: None Today    Treatment     THERAPEUTIC EXERCISE: (35 minutes): Exercises per grid below to improve mobility and strength. Required minimal visual, verbal and tactile cues to promote proper body alignment and posture. Progressed resistance, range, and repetitions as indicated.  - Application of KT tape to R shoulder for AC joint correction, deltoid inhibition, and supraspinatus facilitation. Patient education and demonstrated understanding on potential adverse effects on skin and to remove as needed.     MANUAL THERAPY: (15 minutes): STM/trigger point release to upper trap, deltoid, distal bicep, pectoral and subscap region in sidelying; scapular mobilizations in all planes to tolerance; gentle scar mobilization to clean, closed incision     Date: 4/15/25 Date: 4/17/25 Date: 4/21/25 Date: 4/23/25 Date:4/28/25   Activity/Exercise        Assessment / education   Review of pulley setup Posture, anatomy review, precautions  KT tape application + education KT tape application + education   R shoulder ROM   Flexion, abduction, ER  Supine Flexion, abduction, ER  Supine Flexion, abduction, ER  Supine  Flexion, abduction, ER  Supine  Flexion, abduction, ER  Supine, scapular mobilizations   Passive shoulder ER with dowel   Supine with R arm supported  PVC pipe  Supine with R arm supported  PVC pipe   Shoulder flexion walk-outs   2 x 10 B  Hands on stair rail 2 x 10 at counter, focusing on relaxing scapular muscles 1 x 10 B on stair rail 1 x 8 B on stair rail X 10 on  counter   Pendulums    1 x 10 each ER/IR with 3# R     Pulleys   2 minutes  Reviewed setup for HEP 2 minutes 4 minutes  Flexion & abduction R scaption & abduction R scaption & abduction   Sidelying middle trap activation Unable to perform       Supine chest press   1 x 6 B with

## 2025-04-30 ENCOUNTER — HOSPITAL ENCOUNTER (OUTPATIENT)
Dept: PHYSICAL THERAPY | Age: 60
Setting detail: RECURRING SERIES
Discharge: HOME | End: 2025-05-03
Attending: ORTHOPAEDIC SURGERY
Payer: COMMERCIAL

## 2025-04-30 PROCEDURE — 97110 THERAPEUTIC EXERCISES: CPT

## 2025-04-30 ASSESSMENT — PAIN SCALES - GENERAL: PAINLEVEL_OUTOF10: 3

## 2025-04-30 ASSESSMENT — PAIN DESCRIPTION - LOCATION: LOCATION: SHOULDER

## 2025-04-30 NOTE — PROGRESS NOTES
Gracierené Farrell  : 1965  Primary: Florence Antoine Sc (Lynnette MUNGUIA)  Secondary:  [facility] on [address]  [place] [address]  Phone: [phone]  Fax: [phone] Plan Frequency: 2x per week    Plan of Care/Certification Expiration Date: 25        Plan of Care/Certification Expiration Date:  Plan of Care/Certification Expiration Date: 25    Frequency/Duration:  Plan Frequency: 2x per week       Time In/Out:   Time In: 1435  Time Out: 1520     PT Visit Info:    Plan Frequency: 2x per week  Total # of Visits Approved: 30  Total # of Visits to Date: 9  Progress Note Counter: 1      Visit Count: 9                OUTPATIENT PHYSICAL THERAPY:             Initial Assessment 2025                 Episode (R reverse TSA)         Treatment Diagnosis:   Aftercare following right shoulder joint replacement surgery  Chronic right shoulder pain  Medical/Referring Diagnosis:    Presence of right artificial shoulder joint [Z96.611]  Aftercare following right shoulder joint replacement surgery [Z47.1, Z96.611]  Referring Physician: Mckinley Wheat Jr., MD MD Orders: PT Eval and Treat   Return MD Appt: 25  Date of Onset: 25 (DOS)  Allergies: Bee venom, Sulfa antibiotics, and Aspirin  Restrictions/Precautions:    Post Surgical Precautions: Delta protocol      Medications Last Reviewed: 2025     SUBJECTIVE   History of Injury/Illness (Reason for Referral):  Ms. Farrell presents to outpatient PT s/p R shoulder surgery. She underwent a reverse right total shoulder arthroplasty with a Delta Xtend prosthesis and biceps tenodesis on 25 to address rotator cuff arthropathy and bicep tendinitis. She reports a history of R rotator cuff surgery in  by Dr. Wheat. She lives alone and cares for 20 animals. She's had someone come to her house every so often to provide assistance with ADLs and household chores since she is right-handed. She suffered a dog

## 2025-04-30 NOTE — PROGRESS NOTES
Gracie Alejandro [place]  : 1965  Primary: Florence Antoine Sc (Lynnette MUNGUIA)  Secondary:  [facility] on [address]  [place] [address]  Phone: [phone]  Fax: [phone] Plan Frequency: 2x per week    Plan of Care/Certification Expiration Date: 25        Plan of Care/Certification Expiration Date:  Plan of Care/Certification Expiration Date: 25    Frequency/Duration:  Plan Frequency: 2x per week       Time In/Out:   Time In: 1435  Time Out: 1520      PT Visit Info:    Plan Frequency: 2x per week  Total # of Visits Approved: 30  Total # of Visits to Date: 9  Progress Note Counter: 1      Visit Count: 9    OUTPATIENT PHYSICAL THERAPY:  Treatment Note 2025       Charge Capture   Episode (R reverse TSA)               Treatment Diagnosis:   Aftercare following right shoulder joint replacement surgery  Chronic right shoulder pain  Medical/Referring Diagnosis:    Presence of right artificial shoulder joint [Z96.611]  Aftercare following right shoulder joint replacement surgery [Z47.1, Z96.611]  Referring Physician: Mckinley Wheat Jr., MD MD Orders: PT Eval and Treat  Return MD Appt: 25  Date of Onset: 25 (DOS)  Allergies: Bee venom, Sulfa antibiotics, and Aspirin  Restrictions/Precautions:   Post Surgical Precautions: Delta protocol      Interventions Planned: (Treatment may consist of any combination of the following):  Current Treatment Recommendations: Strengthening; ROM; Neuromuscular re-education; Manual; Pain management; Home exercise program; Safety education & training; Modalities; Positioning; Dry needling; Patient/Caregiver education & training; Therapeutic activities    Subjective Comments: Patient reports she is having a little pain this afternoon. She says she thinks the KT tape application on her shoulder helps a lot with posture and pain.    Initial Pain Level:   Shoulder 3/10   Post Session Pain Level:   Shoulder 3/10     Medications

## 2025-05-05 ENCOUNTER — HOSPITAL ENCOUNTER (OUTPATIENT)
Dept: PHYSICAL THERAPY | Age: 60
Setting detail: RECURRING SERIES
Discharge: HOME | End: 2025-05-08
Attending: ORTHOPAEDIC SURGERY
Payer: COMMERCIAL

## 2025-05-05 PROCEDURE — 97110 THERAPEUTIC EXERCISES: CPT

## 2025-05-05 ASSESSMENT — PAIN DESCRIPTION - LOCATION: LOCATION: SHOULDER

## 2025-05-05 ASSESSMENT — PAIN SCALES - GENERAL: PAINLEVEL_OUTOF10: 4

## 2025-05-05 NOTE — PROGRESS NOTES
Gracie Alejandro [place]  : 1965  Primary: Florence Antoine Sc (Lynnette MUNGUIA)  Secondary:  [facility] on [address]  [place] [address]  Phone: [phone]  Fax: [phone] Plan Frequency: 2x per week    Plan of Care/Certification Expiration Date: 25        Plan of Care/Certification Expiration Date:  Plan of Care/Certification Expiration Date: 25    Frequency/Duration:  Plan Frequency: 2x per week       Time In/Out:   Time In: 1432  Time Out: 1512      PT Visit Info:    Plan Frequency: 2x per week  Total # of Visits Approved: 30  Total # of Visits to Date: 10  Progress Note Counter: 2      Visit Count: 10    OUTPATIENT PHYSICAL THERAPY:  Treatment Note 2025       Charge Capture   Episode (R reverse TSA)               Treatment Diagnosis:   Aftercare following right shoulder joint replacement surgery  Chronic right shoulder pain  Medical/Referring Diagnosis:    Presence of right artificial shoulder joint [Z96.611]  Aftercare following right shoulder joint replacement surgery [Z47.1, Z96.611]  Referring Physician: Mckinley Wheat Jr., MD MD Orders: PT Eval and Treat  Return MD Appt: 25  Date of Onset: 25 (DOS)  Allergies: Bee venom, Sulfa antibiotics, and Aspirin  Restrictions/Precautions:   Post Surgical Precautions: Delta protocol      Interventions Planned: (Treatment may consist of any combination of the following):  Current Treatment Recommendations: Strengthening; ROM; Neuromuscular re-education; Manual; Pain management; Home exercise program; Safety education & training; Modalities; Positioning; Dry needling; Patient/Caregiver education & training; Therapeutic activities    Subjective Comments: Patient reports she thinks the KT tape helped a lot again. She says she's having a little pain this afternoon.    Initial Pain Level:   Shoulder 4/10   Post Session Pain Level:   Shoulder 5/10     Medications Last Reviewed: 2025    Updated Objective

## 2025-05-07 ENCOUNTER — OFFICE VISIT (OUTPATIENT)
Dept: ORTHOPEDIC SURGERY | Age: 60
End: 2025-05-07

## 2025-05-07 ENCOUNTER — TELEPHONE (OUTPATIENT)
Dept: ORTHOPEDIC SURGERY | Age: 60
End: 2025-05-07

## 2025-05-07 DIAGNOSIS — Z47.1 AFTERCARE FOLLOWING RIGHT SHOULDER JOINT REPLACEMENT SURGERY: ICD-10-CM

## 2025-05-07 DIAGNOSIS — Z96.611 AFTERCARE FOLLOWING RIGHT SHOULDER JOINT REPLACEMENT SURGERY: ICD-10-CM

## 2025-05-07 DIAGNOSIS — Z96.611 PRESENCE OF RIGHT ARTIFICIAL SHOULDER JOINT: Primary | ICD-10-CM

## 2025-05-07 DIAGNOSIS — G56.81 OTHER SPECIFIED MONONEUROPATHIES OF RIGHT UPPER LIMB: ICD-10-CM

## 2025-05-07 PROCEDURE — 99024 POSTOP FOLLOW-UP VISIT: CPT | Performed by: ORTHOPAEDIC SURGERY

## 2025-05-07 NOTE — PROGRESS NOTES
Name: Gracie Farrell  YOB: 1965  Gender: female  MRN: [id number]            HPI: Gracie Farrell is a 60 y.o. right-hand-dominant female 6 weeks status post reverse right total shoulder arthroplasty with a delta xtend prosthesis biceps tenodesis.  She returns and is doing well.   She is very weak and has limited function      ROS/Meds/PSH/PMH/FH/SH: A ten system review of systems was performed and is negative other than what is in the HPI.   Tobacco:  reports that she has been smoking cigarettes. She started smoking about 51 years ago. She has a 51.3 pack-year smoking history. She has never used smokeless tobacco.  There were no vitals taken for this visit.     Physical Examination:  She is an awake alert pleasant female ambulating without difficulty  She has restricted range of cervical spine motion without radicular findings    The left shoulder has 0 to 180 degrees of active and 0 to 180 degrees passive forward elevation.   Internal rotation is to T6.  External rotation is to 60 degrees at the side.   In the 90 degree abducted position 90 degrees of external and 90 degrees internal rotation  The AC joint is non-tender  SC joint is non-tender.   Greater tuberosity is non-tender.  negative biceps  Negative O'Briens sign  negative lift-off sign  Negative belly press sign  Negative bear huggers sign  negative drop sign  negative hornblower's sign  No posterior glenohumeral joint line tenderness.   No evident excessive external rotation  Rotator cuff strength is 5/5.  negative external rotation stress test.   Negative empty can sign  There is no evident anterior or posterior apprehension with a negative sulcus sign.   No instability  negative external and internal Rotation lag sign  Neurovascularly intact.    The right shoulder deltopectoral incision has healed  Active forward elevation is 0-60  Passively goes 0-90  ER to 30  Biceps has good cosmetic appearance  She has

## 2025-05-08 ENCOUNTER — APPOINTMENT (OUTPATIENT)
Dept: PHYSICAL THERAPY | Age: 60
End: 2025-05-08
Attending: ORTHOPAEDIC SURGERY
Payer: COMMERCIAL

## 2025-05-09 ASSESSMENT — PATIENT HEALTH QUESTIONNAIRE - PHQ9
1. LITTLE INTEREST OR PLEASURE IN DOING THINGS: NOT AT ALL
SUM OF ALL RESPONSES TO PHQ9 QUESTIONS 1 & 2: 0
SUM OF ALL RESPONSES TO PHQ QUESTIONS 1-9: 0
2. FEELING DOWN, DEPRESSED OR HOPELESS: NOT AT ALL
SUM OF ALL RESPONSES TO PHQ QUESTIONS 1-9: 0
SUM OF ALL RESPONSES TO PHQ QUESTIONS 1-9: 0
1. LITTLE INTEREST OR PLEASURE IN DOING THINGS: NOT AT ALL
2. FEELING DOWN, DEPRESSED OR HOPELESS: NOT AT ALL
SUM OF ALL RESPONSES TO PHQ QUESTIONS 1-9: 0

## 2025-05-12 ENCOUNTER — APPOINTMENT (OUTPATIENT)
Dept: PHYSICAL THERAPY | Age: 60
End: 2025-05-12
Attending: ORTHOPAEDIC SURGERY
Payer: COMMERCIAL

## 2025-05-12 ENCOUNTER — PROCEDURE VISIT (OUTPATIENT)
Dept: NEUROLOGY | Age: 60
End: 2025-05-12
Payer: COMMERCIAL

## 2025-05-12 VITALS — BODY MASS INDEX: 19.14 KG/M2 | WEIGHT: 108 LBS

## 2025-05-12 DIAGNOSIS — R29.898 WEAKNESS OF RIGHT SHOULDER: ICD-10-CM

## 2025-05-12 DIAGNOSIS — G89.29 CHRONIC RIGHT SHOULDER PAIN: ICD-10-CM

## 2025-05-12 DIAGNOSIS — G56.21 CUBITAL TUNNEL SYNDROME ON RIGHT: ICD-10-CM

## 2025-05-12 DIAGNOSIS — G56.01 MILD CARPAL TUNNEL SYNDROME OF RIGHT WRIST: ICD-10-CM

## 2025-05-12 DIAGNOSIS — M25.511 CHRONIC RIGHT SHOULDER PAIN: ICD-10-CM

## 2025-05-12 DIAGNOSIS — G56.90 AXILLARY NEUROPATHY: Primary | ICD-10-CM

## 2025-05-12 DIAGNOSIS — R20.0 RIGHT UPPER EXTREMITY NUMBNESS: ICD-10-CM

## 2025-05-12 PROCEDURE — 95886 MUSC TEST DONE W/N TEST COMP: CPT | Performed by: PSYCHIATRY & NEUROLOGY

## 2025-05-12 PROCEDURE — 95913 NRV CNDJ TEST 13/> STUDIES: CPT | Performed by: PSYCHIATRY & NEUROLOGY

## 2025-05-12 SDOH — HEALTH STABILITY: PHYSICAL HEALTH: ON AVERAGE, HOW MANY DAYS PER WEEK DO YOU ENGAGE IN MODERATE TO STRENUOUS EXERCISE (LIKE A BRISK WALK)?: 0 DAYS

## 2025-05-12 NOTE — PROGRESS NOTES
AT THE CARPAL SEGMENT AND ULNAR NEUROPATHY AT THE ELBOW SEGMENT.   NORMAL VARIANT RIGHT JESSICA-EDIN ANASTOMOSIS.             CONCLUSION:    Abnormalities include right axillary neuropathy that is severe; mild right carpal and right ulnar neuropathies.  Carpal and cubital tunnels.              Procedure Details:   Further clinical correlation is warranted.    Patient made fully aware.                  Please Note::     Data and waveforms * filed under Procedure.    See Procedure Files for data pages.       [ *NOTE:  parts or all of this report are produced using artificial voice recognition software.  Some speech errors are inherent in such software and may be included in the produced record. ]           Mike Atkins MD            [facility]   [place] [address]  Phone:  [phone]  Fax:   [phone]          + + +   Glossary:   MUP: motor unit potential;  SNAP: sensory nerve action potential; Fibs:  Fibrillations;  Fascic: fasciculations; IA: insertional activity;  IP: interference pattern;  SCV :  Sensory conduction velocity;  MCV: motor conduction velocity; NOTE:: muscles are abbreviated latin initials.     [place] raw datafile::   * filed at Procedure or Media Files. *

## 2025-05-14 ENCOUNTER — APPOINTMENT (OUTPATIENT)
Dept: PHYSICAL THERAPY | Age: 60
End: 2025-05-14
Attending: ORTHOPAEDIC SURGERY
Payer: COMMERCIAL

## 2025-05-15 ENCOUNTER — OFFICE VISIT (OUTPATIENT)
Dept: INTERNAL MEDICINE CLINIC | Facility: CLINIC | Age: 60
End: 2025-05-15
Payer: COMMERCIAL

## 2025-05-15 VITALS
DIASTOLIC BLOOD PRESSURE: 88 MMHG | OXYGEN SATURATION: 97 % | HEART RATE: 76 BPM | SYSTOLIC BLOOD PRESSURE: 145 MMHG | WEIGHT: 108 LBS | BODY MASS INDEX: 19.14 KG/M2

## 2025-05-15 DIAGNOSIS — Z79.899 OTHER LONG TERM (CURRENT) DRUG THERAPY: ICD-10-CM

## 2025-05-15 DIAGNOSIS — E78.5 DYSLIPIDEMIA: ICD-10-CM

## 2025-05-15 DIAGNOSIS — M25.50 POLYARTHRALGIA: ICD-10-CM

## 2025-05-15 DIAGNOSIS — E55.9 VITAMIN D DEFICIENCY: ICD-10-CM

## 2025-05-15 DIAGNOSIS — R73.9 ELEVATED RANDOM BLOOD GLUCOSE LEVEL: ICD-10-CM

## 2025-05-15 DIAGNOSIS — Z76.89 ENCOUNTER TO ESTABLISH CARE: Primary | ICD-10-CM

## 2025-05-15 DIAGNOSIS — K21.9 GASTROESOPHAGEAL REFLUX DISEASE, UNSPECIFIED WHETHER ESOPHAGITIS PRESENT: ICD-10-CM

## 2025-05-15 DIAGNOSIS — Z01.419 ENCOUNTER FOR GYNECOLOGICAL EXAMINATION: ICD-10-CM

## 2025-05-15 DIAGNOSIS — D64.9 NORMOCYTIC ANEMIA: ICD-10-CM

## 2025-05-15 DIAGNOSIS — F17.210 CIGARETTE NICOTINE DEPENDENCE WITHOUT COMPLICATION: ICD-10-CM

## 2025-05-15 DIAGNOSIS — G56.90 AXILLARY NEUROPATHY: ICD-10-CM

## 2025-05-15 DIAGNOSIS — G47.11 IDIOPATHIC HYPERSOMNIA: ICD-10-CM

## 2025-05-15 DIAGNOSIS — G47.33 OSA (OBSTRUCTIVE SLEEP APNEA): ICD-10-CM

## 2025-05-15 DIAGNOSIS — G56.01 CARPAL TUNNEL SYNDROME OF RIGHT WRIST: ICD-10-CM

## 2025-05-15 DIAGNOSIS — G56.21 CUBITAL TUNNEL SYNDROME ON RIGHT: ICD-10-CM

## 2025-05-15 DIAGNOSIS — Z87.891 PERSONAL HISTORY OF TOBACCO USE: ICD-10-CM

## 2025-05-15 PROBLEM — M12.811 ROTATOR CUFF TEAR ARTHROPATHY OF RIGHT SHOULDER: Status: RESOLVED | Noted: 2025-02-05 | Resolved: 2025-05-15

## 2025-05-15 PROBLEM — M75.101 ROTATOR CUFF TEAR ARTHROPATHY OF RIGHT SHOULDER: Status: RESOLVED | Noted: 2025-02-05 | Resolved: 2025-05-15

## 2025-05-15 PROBLEM — M75.21 BICIPITAL TENDINITIS OF RIGHT SHOULDER: Status: RESOLVED | Noted: 2025-03-15 | Resolved: 2025-05-15

## 2025-05-15 LAB
ALBUMIN SERPL-MCNC: 3.6 G/DL (ref 3.2–4.6)
ALBUMIN/GLOB SERPL: 1 (ref 1–1.9)
ALP SERPL-CCNC: 92 U/L (ref 35–104)
ALT SERPL-CCNC: 9 U/L (ref 8–45)
ANION GAP SERPL CALC-SCNC: 12 MMOL/L (ref 7–16)
AST SERPL-CCNC: 15 U/L (ref 15–37)
BASOPHILS # BLD: 0.09 K/UL (ref 0–0.2)
BASOPHILS NFR BLD: 1.5 % (ref 0–2)
BILIRUB SERPL-MCNC: <0.2 MG/DL (ref 0–1.2)
BUN SERPL-MCNC: 16 MG/DL (ref 8–23)
CALCIUM SERPL-MCNC: 9.7 MG/DL (ref 8.8–10.2)
CHLORIDE SERPL-SCNC: 103 MMOL/L (ref 98–107)
CHOLEST SERPL-MCNC: 274 MG/DL (ref 0–200)
CO2 SERPL-SCNC: 27 MMOL/L (ref 20–29)
CREAT SERPL-MCNC: 0.73 MG/DL (ref 0.6–1.1)
DIFFERENTIAL METHOD BLD: ABNORMAL
EOSINOPHIL # BLD: 0.14 K/UL (ref 0–0.8)
EOSINOPHIL NFR BLD: 2.3 % (ref 0.5–7.8)
ERYTHROCYTE [DISTWIDTH] IN BLOOD BY AUTOMATED COUNT: 13.4 % (ref 11.9–14.6)
EST. AVERAGE GLUCOSE BLD GHB EST-MCNC: 98 MG/DL
GLOBULIN SER CALC-MCNC: 3.5 G/DL (ref 2.3–3.5)
GLUCOSE SERPL-MCNC: 84 MG/DL (ref 70–99)
HBA1C MFR BLD: 5 % (ref 0–5.6)
HCT VFR BLD AUTO: 42.2 % (ref 35.8–46.3)
HDLC SERPL-MCNC: 41 MG/DL (ref 40–60)
HDLC SERPL: 6.7 (ref 0–5)
HGB BLD-MCNC: 13.7 G/DL (ref 11.7–15.4)
IMM GRANULOCYTES # BLD AUTO: 0.01 K/UL (ref 0–0.5)
IMM GRANULOCYTES NFR BLD AUTO: 0.2 % (ref 0–5)
LDLC SERPL CALC-MCNC: 208 MG/DL (ref 0–100)
LYMPHOCYTES # BLD: 1.58 K/UL (ref 0.5–4.6)
LYMPHOCYTES NFR BLD: 25.9 % (ref 13–44)
MCH RBC QN AUTO: 28.4 PG (ref 26.1–32.9)
MCHC RBC AUTO-ENTMCNC: 32.5 G/DL (ref 31.4–35)
MCV RBC AUTO: 87.6 FL (ref 82–102)
MONOCYTES # BLD: 0.48 K/UL (ref 0.1–1.3)
MONOCYTES NFR BLD: 7.9 % (ref 4–12)
NEUTS SEG # BLD: 3.79 K/UL (ref 1.7–8.2)
NEUTS SEG NFR BLD: 62.2 % (ref 43–78)
NRBC # BLD: 0 K/UL (ref 0–0.2)
PLATELET # BLD AUTO: 301 K/UL (ref 150–450)
PMV BLD AUTO: 9.2 FL (ref 9.4–12.3)
POTASSIUM SERPL-SCNC: 4.1 MMOL/L (ref 3.5–5.1)
PROT SERPL-MCNC: 7.1 G/DL (ref 6.3–8.2)
RBC # BLD AUTO: 4.82 M/UL (ref 4.05–5.2)
SODIUM SERPL-SCNC: 142 MMOL/L (ref 136–145)
TRIGL SERPL-MCNC: 128 MG/DL (ref 0–150)
VLDLC SERPL CALC-MCNC: 26 MG/DL (ref 6–23)
WBC # BLD AUTO: 6.1 K/UL (ref 4.3–11.1)

## 2025-05-15 PROCEDURE — G0296 VISIT TO DETERM LDCT ELIG: HCPCS | Performed by: INTERNAL MEDICINE

## 2025-05-15 PROCEDURE — 99204 OFFICE O/P NEW MOD 45 MIN: CPT | Performed by: INTERNAL MEDICINE

## 2025-05-15 NOTE — PROGRESS NOTES
Pepe Sorenson D.O.   [place] [address]  Tel: [phone]    History and Physical Office Visit     Patient Name: Gracie Farrell    :  1965   MRN:   [id number]      Today's Date: 05/15/25 7:31 AM    Subjective     The patient is a 60 y.o. year old female with a pmh as listed below. The patient presents today to establish care.    Family history of cancer: mother had breast cancer and father had throat cancer   Family history of heart disease/early onset MI: mother had heart disease     Diet: She reports she does not eat healthy  Exercise: she reports she does not exercise but does stay active   Alcohol: none   Cigarettes: 1 ppd since    Sexually active: not currently   Occupation: unemployed     Idiopathic Hypersomnia and possible Obstructive sleep apnea  - Patient previously following with Dr. Cerrato  - Used to 90 mg of adderall for years.   - Patient previously on CPAP.   - She reports she is sleeping well at night right now.     GERD  - Omeprazole 20 mg daily, she states she cannot do without this medicine.     Elevated blood glucose level  - Blood glucose level on 3/21/2025 was 127  - She reports she does not eat healthy. She reports she does not exercise but does stay active     Normocytic anemia  - CBC from 3/21/2025 showed hemoglobin of 10.6, hematocrit of 32.3, MCV of 83.5    Vitamin D deficiency  - Vitamin D on 3/20/2025 was 16.6  - she is taking Vitamin D 5000 units daily     Nicotine dependence due to cigarettes  - Patient has been smoking 1 pack a day since , 51.4 pack years  - Low-dose CAT scan of the lungs due    Right axillary neuropathy, severe  - Patient is status post reverse right total shoulder arthroplasty on 3/20/2025.  - EMG on 2025 showed severe right axillary  - Patient following with orthopedic surgery, next appointment is 2025.    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

## 2025-05-16 LAB
RHEUMATOID FACT SER QL LA: POSITIVE
RHEUMATOID FACT TITR SER LA: NORMAL {TITER}

## 2025-05-18 LAB — ANA SER QL: NEGATIVE

## 2025-05-19 ENCOUNTER — OFFICE VISIT (OUTPATIENT)
Dept: ORTHOPEDIC SURGERY | Age: 60
End: 2025-05-19

## 2025-05-19 ENCOUNTER — APPOINTMENT (OUTPATIENT)
Dept: PHYSICAL THERAPY | Age: 60
End: 2025-05-19
Attending: ORTHOPAEDIC SURGERY
Payer: COMMERCIAL

## 2025-05-19 DIAGNOSIS — Z47.1 AFTERCARE FOLLOWING RIGHT SHOULDER JOINT REPLACEMENT SURGERY: Primary | ICD-10-CM

## 2025-05-19 DIAGNOSIS — G56.01 CARPAL TUNNEL SYNDROME, RIGHT: ICD-10-CM

## 2025-05-19 DIAGNOSIS — G56.81 OTHER SPECIFIED MONONEUROPATHIES OF RIGHT UPPER LIMB: ICD-10-CM

## 2025-05-19 DIAGNOSIS — Z96.611 PRESENCE OF RIGHT ARTIFICIAL SHOULDER JOINT: ICD-10-CM

## 2025-05-19 DIAGNOSIS — Z96.611 AFTERCARE FOLLOWING RIGHT SHOULDER JOINT REPLACEMENT SURGERY: Primary | ICD-10-CM

## 2025-05-19 DIAGNOSIS — G56.21 CUBITAL TUNNEL SYNDROME ON RIGHT: ICD-10-CM

## 2025-05-19 LAB — CCP IGA+IGG SERPL IA-ACNC: 233 UNITS (ref 0–19)

## 2025-05-19 PROCEDURE — 99024 POSTOP FOLLOW-UP VISIT: CPT | Performed by: ORTHOPAEDIC SURGERY

## 2025-05-19 NOTE — PROGRESS NOTES
Name: Gracie Farrell  YOB: 1965  Gender: female  MRN: [id number]            HPI: Gracie Farrell is a 60 y.o. right-hand-dominant female 2 months status post reverse right total shoulder arthroplasty with a delta xtend prosthesis biceps tenodesis.  She returns and is doing well.   She is very weak and has limited function      ROS/Meds/PSH/PMH/FH/SH: A ten system review of systems was performed and is negative other than what is in the HPI.   Tobacco:  reports that she has been smoking cigarettes. She started smoking about 51 years ago. She has a 51.4 pack-year smoking history. She has never used smokeless tobacco.  There were no vitals taken for this visit.     Physical Examination:  She is an awake alert pleasant female ambulating without difficulty  She has restricted range of cervical spine motion without radicular findings    The left shoulder has 0 to 180 degrees of active and 0 to 180 degrees passive forward elevation.   Internal rotation is to T6.  External rotation is to 60 degrees at the side.   In the 90 degree abducted position 90 degrees of external and 90 degrees internal rotation  The AC joint is non-tender  SC joint is non-tender.   Greater tuberosity is non-tender.  negative biceps  Negative O'Briens sign  negative lift-off sign  Negative belly press sign  Negative bear huggers sign  negative drop sign  negative hornblower's sign  No posterior glenohumeral joint line tenderness.   No evident excessive external rotation  Rotator cuff strength is 5/5.  negative external rotation stress test.   Negative empty can sign  There is no evident anterior or posterior apprehension with a negative sulcus sign.   No instability  negative external and internal Rotation lag sign  Neurovascularly intact.    The right shoulder deltopectoral incision has healed  Active forward elevation is 0-60  Passively goes 0-90  ER to 30  Biceps has good cosmetic appearance  She has

## 2025-05-20 ENCOUNTER — RESULTS FOLLOW-UP (OUTPATIENT)
Dept: INTERNAL MEDICINE CLINIC | Facility: CLINIC | Age: 60
End: 2025-05-20

## 2025-05-20 DIAGNOSIS — R76.8 POSITIVE ANTI-CCP TEST: ICD-10-CM

## 2025-05-20 DIAGNOSIS — M05.80 POLYARTHRITIS WITH POSITIVE RHEUMATOID FACTOR (HCC): Primary | ICD-10-CM

## 2025-05-20 NOTE — RESULT ENCOUNTER NOTE
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  I am going to refer her to a rheumatologist to have this further evaluated.

## 2025-05-21 ENCOUNTER — APPOINTMENT (OUTPATIENT)
Dept: PHYSICAL THERAPY | Age: 60
End: 2025-05-21
Attending: ORTHOPAEDIC SURGERY
Payer: COMMERCIAL

## 2025-05-27 DIAGNOSIS — M19.011 DEGENERATIVE JOINT DISEASE OF RIGHT ACROMIOCLAVICULAR JOINT: ICD-10-CM

## 2025-05-27 DIAGNOSIS — M12.811 ROTATOR CUFF TEAR ARTHROPATHY OF RIGHT SHOULDER: ICD-10-CM

## 2025-05-27 DIAGNOSIS — M75.21 BICIPITAL TENDINITIS OF RIGHT SHOULDER: ICD-10-CM

## 2025-05-27 DIAGNOSIS — M75.101 ROTATOR CUFF TEAR ARTHROPATHY OF RIGHT SHOULDER: ICD-10-CM

## 2025-05-27 RX ORDER — MELOXICAM 15 MG/1
15 TABLET ORAL DAILY
Qty: 30 TABLET | Refills: 2 | Status: SHIPPED | OUTPATIENT
Start: 2025-05-27 | End: 2025-08-25

## 2025-05-29 ENCOUNTER — HOSPITAL ENCOUNTER (OUTPATIENT)
Dept: CT IMAGING | Age: 60
Discharge: HOME | End: 2025-05-31
Attending: INTERNAL MEDICINE
Payer: COMMERCIAL

## 2025-05-29 DIAGNOSIS — Z87.891 PERSONAL HISTORY OF TOBACCO USE: ICD-10-CM

## 2025-05-29 PROCEDURE — 71271 CT THORAX LUNG CANCER SCR C-: CPT

## 2025-05-30 NOTE — RESULT ENCOUNTER NOTE
Patient's CT scan of the lungs showed no signs of cancer, it did show one 4 mm nodule that is benign.

## 2025-06-17 ENCOUNTER — OFFICE VISIT (OUTPATIENT)
Dept: ORTHOPEDIC SURGERY | Age: 60
End: 2025-06-17
Payer: COMMERCIAL

## 2025-06-17 DIAGNOSIS — G56.81 OTHER SPECIFIED MONONEUROPATHIES OF RIGHT UPPER LIMB: ICD-10-CM

## 2025-06-17 DIAGNOSIS — Z96.611 PRESENCE OF RIGHT ARTIFICIAL SHOULDER JOINT: ICD-10-CM

## 2025-06-17 DIAGNOSIS — Z96.611 AFTERCARE FOLLOWING RIGHT SHOULDER JOINT REPLACEMENT SURGERY: Primary | ICD-10-CM

## 2025-06-17 DIAGNOSIS — Z47.1 AFTERCARE FOLLOWING RIGHT SHOULDER JOINT REPLACEMENT SURGERY: Primary | ICD-10-CM

## 2025-06-17 DIAGNOSIS — G56.01 CARPAL TUNNEL SYNDROME, RIGHT: ICD-10-CM

## 2025-06-17 DIAGNOSIS — G56.21 CUBITAL TUNNEL SYNDROME ON RIGHT: ICD-10-CM

## 2025-06-17 PROCEDURE — 99212 OFFICE O/P EST SF 10 MIN: CPT | Performed by: ORTHOPAEDIC SURGERY

## 2025-06-17 RX ORDER — HYDROCODONE BITARTRATE AND ACETAMINOPHEN 10; 325 MG/1; MG/1
1 TABLET ORAL EVERY 6 HOURS PRN
Qty: 28 TABLET | Refills: 0 | Status: SHIPPED | OUTPATIENT
Start: 2025-06-17 | End: 2025-06-24

## 2025-06-17 NOTE — PROGRESS NOTES
Name: Gracie Farrell  YOB: 1965  Gender: female  MRN: [id number]            HPI: Gracie Farrell is a 60 y.o. right-hand-dominant female 3.5 months status post reverse right total shoulder arthroplasty with a delta xtend prosthesis biceps tenodesis.  She has a known axillary nerve neuropraxia      ROS/Meds/PSH/PMH/FH/SH: A ten system review of systems was performed and is negative other than what is in the HPI.   Tobacco:  reports that she has been smoking cigarettes. She started smoking about 51 years ago. She has a 51.5 pack-year smoking history. She has never used smokeless tobacco.  There were no vitals taken for this visit.     Physical Examination:  She is an awake alert pleasant female ambulating without difficulty  She has restricted range of cervical spine motion without radicular findings    The left shoulder has 0 to 180 degrees of active and 0 to 180 degrees passive forward elevation.   Internal rotation is to T6.  External rotation is to 60 degrees at the side.   In the 90 degree abducted position 90 degrees of external and 90 degrees internal rotation  The AC joint is non-tender  SC joint is non-tender.   Greater tuberosity is non-tender.  negative biceps  Negative O'Briens sign  negative lift-off sign  Negative belly press sign  Negative bear huggers sign  negative drop sign  negative hornblower's sign  No posterior glenohumeral joint line tenderness.   No evident excessive external rotation  Rotator cuff strength is 5/5.  negative external rotation stress test.   Negative empty can sign  There is no evident anterior or posterior apprehension with a negative sulcus sign.   No instability  negative external and internal Rotation lag sign  Neurovascularly intact.    The right shoulder deltopectoral incision has healed  Active forward elevation is 0-60  Passively goes 0-90  ER to 30  Biceps has good cosmetic appearance  She has diminished sensation in the axillary   ED Adult Note        Patient : Louie Ortiz Age: 76 year old Sex: male   MRN: [id number] Encounter Date: 1/9/2021 2045      History   HPI:   This patient was evaluated, managed, and treated in the context of COVID-19 pandemic.  As such, associated resources were utilized in patient care including goggles, mask, surgical cap and gloves  This is a 76-year-old male with a history of dementia who was sent here from [facility].  According to the staff's provided history the patient became more unresponsive today than his baseline.  The patient is normally alert and oriented x2 and he is now alert and oriented x1.  He states that this mental status change occurred approximately 4 days ago.  Today he was noted to be febrile, and tachypneic.  Patient was transferred to the emergency department at that time.  On arrival the patient is moaning, he is breathing rapidly, he is unable to answer any questions but does respond to his name.  ROS:  Review of systems as documented in the HPI.  All other ROS reviewed and are negative    Review of Systems   Unable to perform ROS: Dementia        No Known Allergies    There are no discharge medications for this patient.      There are no discharge medications for this patient.      Past Medical History:   Diagnosis Date   • Abdominal aortic aneurysm (AAA) (CMS/HCC)    • BPH (benign prostatic hyperplasia)    • Essential (primary) hypertension    • High cholesterol    • Myasthenia (CMS/HCC)    • Syncope    • TIA (transient ischemic attack)    • Uncomplicated senile dementia (CMS/HCC)    • Urinary tract infection        No past surgical history on file.    No family history on file.    Social History     Tobacco Use   • Smoking status: Not on file   Substance Use Topics   • Alcohol use: Not on file   • Drug use: Not on file       Physical Exam     ED Triage Vitals   ED Triage Vitals Group      Temp 01/09/21 2000 (!) 101.5 °F (38.6 °C)      Heart Rate 01/09/21 2000 (!) 115       Resp 01/09/21 2000 (!) 25      BP 01/09/21 2000 (!) 143/109      SpO2 01/09/21 2000 97 %      EtCO2 mmHg --       Height 01/09/21 2050 5' 11\" (1.803 m)      Weight 01/09/21 2050 249 lb 5.4 oz (113.1 kg)      Weight Scale Used 01/09/21 2050 Scale in bed      BMI (Calculated) 01/09/21 2050 34.78      IBW/kg (Calculated) 01/09/21 2050 75.3     Vitals:    01/09/21 2048 01/09/21 2050 01/09/21 2052 01/09/21 2118   BP: 139/73   122/74   BP Location:       Patient Position:       Pulse: (!) 101  98 97   Resp: (!) 29  (!) 28 (!) 28   Temp:       TempSrc:       SpO2:   95% 95%   Weight:  113.1 kg (249 lb 5.4 oz)     Height:  5' 11\" (1.803 m)         Physical Exam  Constitutional:       General: He is in acute distress.      Appearance: He is obese. He is ill-appearing.      Comments: The patient responds to his name, he attempts to answer questions but is mainly moaning   HENT:      Head: Normocephalic and atraumatic.      Mouth/Throat:      Mouth: Mucous membranes are dry.   Eyes:      Conjunctiva/sclera: Conjunctivae normal.      Pupils: Pupils are equal, round, and reactive to light.   Cardiovascular:      Rate and Rhythm: Regular rhythm. Tachycardia present.      Pulses: Normal pulses.   Pulmonary:      Effort: Respiratory distress present.      Breath sounds: Rhonchi present.   Abdominal:      Palpations: Abdomen is soft.      Tenderness: There is abdominal tenderness (The patient groans with palpation of his abdomen.  There are no masses.  But he appears to be uncomfortable this is specifically over the suprapubic region).   Genitourinary:     Penis: Normal.       Comments: The patient has swollen testicles bilaterally  Musculoskeletal:         General: Swelling present.      Comments: Excoriated rash is present on the bilateral pretibial regions   Skin:     General: Skin is warm and dry.   Neurological:      Mental Status: He is disoriented.            ED Course     ED Course as of Jan 09 2333   Sat Jan 09, 2021    2319 Patient appears septic.  30 mL/kg fluid bolus was started and broad-spectrum antibiotics given in the ED.  Patient's urine which appears in a Lee catheter that was placed by nursing staff is dark, as well as appearing purulent with quite a bit of sediment present.  Antibiotics were specifically targeted to cover for urosepsis as I do feel that this is likely the patient's etiology of his infection.  However patient will also be placed in the hospital as a PUI given his inflammatory marker elevations.    [SM]   2320 Following fluid resuscitation the patient appears much improved, his heart rate has come down.  Fever control was also given.    [SM]      ED Course User Index  [SM] Kecia Fontanez MD       Procedures    Lab Results     Results for orders placed or performed during the hospital encounter of 01/09/21   Urinalysis & Reflex Microscopy With Culture If Indicated   Result Value Ref Range    COLOR, URINALYSIS Yellow     APPEARANCE, URINALYSIS Cloudy     GLUCOSE, URINALYSIS Negative Negative mg/dL    BILIRUBIN, URINALYSIS Negative Negative    KETONES, URINALYSIS Negative Negative mg/dL    SPECIFIC GRAVITY, URINALYSIS 1.010 1.005 - 1.030    OCCULT BLOOD, URINALYSIS Moderate (A) Negative    PH, URINALYSIS 5.0 5.0 - 7.0    PROTEIN, URINALYSIS 100  (A) Negative mg/dL    UROBILINOGEN, URINALYSIS 0.2 0.2, 1.0 mg/dL    NITRITE, URINALYSIS Negative Negative    LEUKOCYTE ESTERASE, URINALYSIS Large (A) Negative    SQUAMOUS EPITHELIAL, URINALYSIS 1 to 5 None Seen, 1 to 5 /hpf    ERYTHROCYTES, URINALYSIS 26 to 100 (A) None Seen, 1 to 2 /hpf    LEUKOCYTES, URINALYSIS >100 (A) None Seen, 1 to 5 /hpf    BACTERIA, URINALYSIS Few (A) None Seen /hpf    HYALINE CASTS, URINALYSIS None Seen None Seen, 1 to 5 /lpf   Comprehensive Metabolic Panel   Result Value Ref Range    Fasting Status      Sodium 134 (L) 135 - 145 mmol/L    Potassium 4.0 3.4 - 5.1 mmol/L    Chloride 100 98 - 107 mmol/L    Carbon Dioxide 25 21 - 32 mmol/L     Anion Gap 13 10 - 20 mmol/L    Glucose 109 (H) 65 - 99 mg/dL    BUN 33 (H) 6 - 20 mg/dL    Creatinine 2.82 (H) 0.67 - 1.17 mg/dL    Glomerular Filtration Rate 20 (L) >90 mL/min/1.73m2    BUN/ Creatinine Ratio 12 7 - 25    Calcium 8.7 8.4 - 10.2 mg/dL    Bilirubin, Total 1.5 (H) 0.2 - 1.0 mg/dL    GOT/AST 20 <=37 Units/L    GPT/ALT 25 <64 Units/L    Alkaline Phosphatase 114 45 - 117 Units/L    Albumin 3.3 (L) 3.6 - 5.1 g/dL    Protein, Total 8.2 6.4 - 8.2 g/dL    Globulin 4.9 (H) 2.0 - 4.0 g/dL    A/G Ratio 0.7 (L) 1.0 - 2.4   Prothrombin Time   Result Value Ref Range    Prothrombin Time 12.0 (H) 9.7 - 11.8 sec    INR 1.2 <=5.0   Troponin I Ultra Sensitive   Result Value Ref Range    Troponin I, Ultra Sensitive <0.02 <=0.04 ng/mL   NT proBNP   Result Value Ref Range    NT-proBNP 1,262 (H) <=450 pg/mL   D Dimer, Quantitative   Result Value Ref Range    D Dimer, Quantitative 7.54 (H) <0.57 mg/L (FEU)   C Reactive Protein   Result Value Ref Range    C-Reactive Protein 22.1 (H) <=1.0 mg/dL   Lactate Dehydrogenase   Result Value Ref Range    LD, Total 245 (H) 86 - 234 Units/L   Ferritin   Result Value Ref Range    Ferritin 413 (H) 26 - 388 ng/mL   CBC with Automated Differential (performable only)   Result Value Ref Range    WBC 18.2 (H) 4.2 - 11.0 K/mcL    RBC 6.13 (H) 4.50 - 5.90 mil/mcL    HGB 16.4 13.0 - 17.0 g/dL    HCT 50.7 39.0 - 51.0 %    MCV 82.7 78.0 - 100.0 fl    MCH 26.8 26.0 - 34.0 pg    MCHC 32.3 32.0 - 36.5 g/dL    RDW-CV 18.3 (H) 11.0 - 15.0 %    RDW-SD 52.0 (H) 39.0 - 50.0 fL     (H) 140 - 450 K/mcL    NRBC 0 <=0 /100 WBC    Neutrophil, Percent 86 %    Lymphocytes, Percent 1 %    Mono, Percent 11 %    Eosinophils, Percent 0 %    Basophils, Percent 1 %    Immature Granulocytes 1 %    Absolute Neutrophils 15.7 (H) 1.8 - 7.7 K/mcL    Absolute Lymphocytes 0.3 (L) 1.0 - 4.0 K/mcL    Absolute Monocytes 2.0 (H) 0.3 - 0.9 K/mcL    Absolute Eosinophils  0.0 0.0 - 0.5 K/mcL    Absolute Basophils 0.1 0.0 -  0.3 K/mcL    Absolute Immmature Granulocytes 0.1 0.0 - 0.2 K/mcL   LACTIC ACID, VENOUS - RESPIRATORY   Result Value Ref Range    LACTIC ACID, VENOUS - RESPIRATORY 3.2 (HH) <2.0 mmol/L   GLUCOSE, BEDSIDE - POINT OF CARE   Result Value Ref Range    GLUCOSE, BEDSIDE - POINT OF CARE 109 (H) 70 - 99 mg/dL       EKG Results     EKG Interpretation 2120  Rate: 100 bpm  Rhythm: sinus tachycardia   Abnormality: Frequent PVC, no acute ST segment changes    EKG tracing interpreted by ED physician    Radiology Results     Imaging Results          US VASC EXTREMITY LOWER VENOUS DUPLEX (In process)                CT ABDOMEN PELVIS WO CONTRAST (In process)    Procedure changed from CT ABDOMEN PELVIS W CONTRAST - IV contrast only                XR CHEST PA OR AP 1 VIEW (Final result)  Result time 01/09/21 22:52:52    Final result                 Impression:      As above.    Electronically Signed by: DILIP ARCE M.D.   Signed on: 1/9/2021 10:52 PM                Narrative:    EXAM: XR CHEST PA OR AP 1 VIEW    CLINICAL INDICATION: Fever.    COMPARISON: None available.    FINDINGS:    The patient is rotated.    Prominent cardiomediastinal silhouette.    Prominent interstitial opacities throughout both lungs which are nonspecific, findings may reflect interstitial edema or atypical/viral illness.    No definite focal airspace consolidation.  No significant pleural effusion.  No definite pneumothorax identified on this semiupright image.    Degenerative changes of the spine and shoulders.                                 [facility]     ED Medication Orders (From admission, onward)    Ordered Start     Status Ordering Provider    01/09/21 2039 01/09/21 2200  vancomycin (VANCOCIN) 1,000 mg in sodium chloride 0.9 % 250 mL IVPB  ONCE      Last MAR action: CATIA Smith    01/09/21 2038 01/09/21 2045  piperacillin-tazobactam (ZOSYN) 4.5 g in sodium chloride 0.9 % 100 mL IVPB  ONCE      Last MAR action: CATIA Smith     01/09/21 2030 01/09/21 2045  acetaminophen (TYLENOL) suppository 650 mg  ONCE      Last MAR action: Given HERNESTO SOLISVERONICA BAINS    01/09/21 2030 01/09/21 2045  acetaminophen (TYLENOL) suppository 325 mg  ONCE      Last MAR action: Given KECIA SOLIS    01/09/21 2021 01/09/21 2030  morphine injection 4 mg  ONCE      Last MAR action: Given KECIA SOLIS           MDM    Clinical Impression     ED Diagnosis   1. Sepsis, due to unspecified organism, unspecified whether acute organ dysfunction present (CMS/[facility])     2. Suspected COVID-19 virus infection          Disposition      No follow-up provider specified.    Admit 1/9/2021  9:42 PM  Telemetry Bed?: Yes  Patient Class: Inpatient [1]  Level of Care: Intermediate/Stepdown [8]  Admission Diagnosis: Sepsis (CMS/HCC) [3217075]  Admitting Physician: ALEJO ROLAND [[id number]]  Requested Unit/Floor: ccsd  Comments: PUI  Is this a telephone or verbal order?: This is a telephone order from the admitting physician                     Kecia Solis MD  01/10/21 0017

## 2025-06-25 ENCOUNTER — HOSPITAL ENCOUNTER (OUTPATIENT)
Dept: PHYSICAL THERAPY | Age: 60
Setting detail: RECURRING SERIES
Discharge: HOME | End: 2025-06-28
Attending: ORTHOPAEDIC SURGERY
Payer: COMMERCIAL

## 2025-06-25 PROCEDURE — 97110 THERAPEUTIC EXERCISES: CPT

## 2025-06-25 ASSESSMENT — PAIN SCALES - GENERAL: PAINLEVEL_OUTOF10: 1

## 2025-06-25 ASSESSMENT — PAIN DESCRIPTION - LOCATION: LOCATION: SHOULDER

## 2025-06-25 NOTE — PROGRESS NOTES
Gracierené Farrell  : 1965  Primary: Florence Antoine Sc (Lynnette MUNGUIA)  Secondary:  [facility] on [address]  [place] [address]  Phone: [phone]  Fax: [phone] Plan Frequency: 2x per week    Plan of Care/Certification Expiration Date: 25        Plan of Care/Certification Expiration Date:  Plan of Care/Certification Expiration Date: 25    Frequency/Duration:  Plan Frequency: 2x per week       Time In/Out:   Time In: 1349  Time Out: 1427     PT Visit Info:    Plan Frequency: 2x per week  Total # of Visits Approved: 30  Total # of Visits to Date: 11  Progress Note Counter: 1      Visit Count: 11                OUTPATIENT PHYSICAL THERAPY:             Recertification 2025                 Episode (R reverse TSA)         Treatment Diagnosis:   Aftercare following right shoulder joint replacement surgery  Chronic right shoulder pain  Medical/Referring Diagnosis:    Presence of right artificial shoulder joint [Z96.611]  Aftercare following right shoulder joint replacement surgery [Z47.1, Z96.611]  Referring Physician: Mckinley Wheat Jr., MD MD Orders: PT Eval and Treat   Return MD Appt: 25  Date of Onset: 25 (DOS)  Allergies: Bee venom, Sulfa antibiotics, and Aspirin  Restrictions/Precautions:    Post Surgical Precautions: Delta protocol      Medications Last Reviewed: 2025     SUBJECTIVE   History of Injury/Illness (Reason for Referral):  Ms. Farrell presents to outpatient PT s/p R shoulder surgery. She underwent a reverse right total shoulder arthroplasty with a Delta Xtend prosthesis and biceps tenodesis on 25 to address rotator cuff arthropathy and bicep tendinitis. She reports a history of R rotator cuff surgery in  by Dr. Wheat. She lives alone and cares for 20 animals. She's had someone come to her house every so often to provide assistance with ADLs and household chores since she is right-handed. She suffered a dog 
Today    Treatment     THERAPEUTIC EXERCISE: (38 minutes): Exercises per grid below to improve mobility and strength. Required minimal visual, verbal and tactile cues to promote proper body alignment and posture. Progressed resistance, range, and repetitions as indicated.     MANUAL THERAPY: (0 minutes): N/A     Date: 4/28/25 Date: 4/30/25 Date: 5/5/25 Date: 6/25/25   Activity/Exercise       Assessment / education   KT tape application + education KT tape application KT tape application  PN assessment   R shoulder ROM   Flexion, abduction, ER  Supine, scapular mobilizations Flexion, abduction, ER  Supine  Flexion, abduction, ER, IR  Supine   Passive shoulder ER with dowel Supine with R arm supported  PVC pipe      Shoulder flexion AAROM   1 x 10 on counter   2 x 10 B 1# wand   UE ranger    2 x 10 R on counter  1 x 10 R on counter     UBE     Attempted, but too painful    Pulleys   R scaption & abduction R scaption & abduction R scaption & abduction     Sidelying middle trap activation  Difficult to perform     Supine chest press   X 10 popping but minimal increase in pain, less popping with therapist stabilizing scap      Seated thoracic rotations         Sidelying shoulder abduction    Difficult to perform 2 x 10 with bent elbow    Sidelying shoulder flexion     2 x 10 R    Prone rows    2 x 12 R 2# 2 x 12 R 2# 2 x 12 R 4#   Prone shoulder extension    2 x 12 R 2# 2 x 12 R 0# 2 x 12 R 2#   Standing shoulder extension      2 x 20 B with dowel   Bicep curls      2 x 10 R 2#     HEP:  - Standing 'L' Stretch at Counter  - 2-3 x daily - 7 x weekly - 1 sets - 10-20 reps  - Supine Shoulder External Rotation with Dowel  - 2 x daily - 7 x weekly - 1 sets - 10-15 reps  - Circular Shoulder Pendulum with Table Support  - 3 x daily - 7 x weekly - 30-60 seconds  - Seated Shoulder Flexion AAROM with Pulley Behind  - 2-3 x daily - 7 x weekly - 1-3 minutes  - Sidelying Shoulder Abduction  - 1 x daily - 3 x weekly - 3 sets - 10

## 2025-06-27 ENCOUNTER — HOSPITAL ENCOUNTER (OUTPATIENT)
Dept: PHYSICAL THERAPY | Age: 60
Setting detail: RECURRING SERIES
Discharge: HOME | End: 2025-06-30
Attending: ORTHOPAEDIC SURGERY
Payer: COMMERCIAL

## 2025-06-27 PROCEDURE — 97110 THERAPEUTIC EXERCISES: CPT

## 2025-06-27 ASSESSMENT — PAIN SCALES - GENERAL: PAINLEVEL_OUTOF10: 0

## 2025-06-30 ENCOUNTER — HOSPITAL ENCOUNTER (OUTPATIENT)
Dept: PHYSICAL THERAPY | Age: 60
Setting detail: RECURRING SERIES
Discharge: HOME | End: 2025-07-03
Attending: ORTHOPAEDIC SURGERY
Payer: COMMERCIAL

## 2025-06-30 PROCEDURE — 97110 THERAPEUTIC EXERCISES: CPT

## 2025-06-30 ASSESSMENT — PAIN DESCRIPTION - LOCATION: LOCATION: SHOULDER

## 2025-06-30 ASSESSMENT — PAIN SCALES - GENERAL: PAINLEVEL_OUTOF10: 2

## 2025-06-30 NOTE — PROGRESS NOTES
Gracie Alejandro Quecreek  : 1965  Primary: Florence Antoine Sc (Lynnette MUNGUIA)  Secondary:  [facility] on [address]  [place] [address]  Phone: [phone]  Fax: [phone] Plan Frequency: 2x per week    Plan of Care/Certification Expiration Date: 25        Plan of Care/Certification Expiration Date:  Plan of Care/Certification Expiration Date: 25    Frequency/Duration:  Plan Frequency: 2x per week       Time In/Out:   Time In: 1431  Time Out: 1509      PT Visit Info:    Plan Frequency: 2x per week  Total # of Visits Approved: 30  Total # of Visits to Date: 13  Progress Note Counter: 3      Visit Count: 13    OUTPATIENT PHYSICAL THERAPY:  Treatment Note 2025       Charge Capture   Episode (R reverse TSA)               Treatment Diagnosis:   Aftercare following right shoulder joint replacement surgery  Chronic right shoulder pain  Medical/Referring Diagnosis:    Presence of right artificial shoulder joint [Z96.611]  Aftercare following right shoulder joint replacement surgery [Z47.1, Z96.611]  Referring Physician: Mckinley Wheat Jr., MD MD Orders: PT Eval and Treat  Return MD Appt: 25  Date of Onset: 25 (DOS)  Allergies: Bee venom, Sulfa antibiotics, and Aspirin  Restrictions/Precautions:   Post Surgical Precautions: Delta protocol      Interventions Planned: (Treatment may consist of any combination of the following):  Current Treatment Recommendations: Strengthening; ROM; Neuromuscular re-education; Manual; Pain management; Home exercise program; Safety education & training; Modalities; Positioning; Dry needling; Patient/Caregiver education & training; Therapeutic activities    Subjective Comments: Patient reports she felt an \"electric shock\" pain in her lower arm and hand yesterday.     Initial Pain Level:   Shoulder 2/10   Post Session Pain Level:   Shoulder 2/10     Medications Last Reviewed: 2025    Updated Objective Findings: None

## 2025-07-02 ENCOUNTER — HOSPITAL ENCOUNTER (OUTPATIENT)
Dept: PHYSICAL THERAPY | Age: 60
Setting detail: RECURRING SERIES
Discharge: HOME | End: 2025-07-05
Attending: ORTHOPAEDIC SURGERY
Payer: COMMERCIAL

## 2025-07-02 PROCEDURE — 97110 THERAPEUTIC EXERCISES: CPT

## 2025-07-02 ASSESSMENT — PAIN DESCRIPTION - LOCATION: LOCATION: SHOULDER

## 2025-07-02 ASSESSMENT — PAIN SCALES - GENERAL: PAINLEVEL_OUTOF10: 2

## 2025-07-02 NOTE — PROGRESS NOTES
Gracie Alejandro Tennga  : 1965  Primary: Florence Antoine Sc (Lynnette MUNGUIA)  Secondary:  [facility] on [address]  [place] [address]  Phone: [phone]  Fax: [phone] Plan Frequency: 2x per week    Plan of Care/Certification Expiration Date: 25        Plan of Care/Certification Expiration Date:  Plan of Care/Certification Expiration Date: 25    Frequency/Duration:  Plan Frequency: 2x per week       Time In/Out:   Time In: 1349  Time Out: 1427      PT Visit Info:    Plan Frequency: 2x per week  Total # of Visits Approved: 30  Total # of Visits to Date: 14  Progress Note Counter: 4      Visit Count: 14    OUTPATIENT PHYSICAL THERAPY:  Treatment Note 2025       Charge Capture   Episode (R reverse TSA)               Treatment Diagnosis:   Aftercare following right shoulder joint replacement surgery  Chronic right shoulder pain  Medical/Referring Diagnosis:    Presence of right artificial shoulder joint [Z96.611]  Aftercare following right shoulder joint replacement surgery [Z47.1, Z96.611]  Referring Physician: Mckinley Wheat Jr., MD MD Orders: PT Eval and Treat  Return MD Appt: 25  Date of Onset: 25 (DOS)  Allergies: Bee venom, Sulfa antibiotics, and Aspirin  Restrictions/Precautions:   Post Surgical Precautions: Delta protocol      Interventions Planned: (Treatment may consist of any combination of the following):  Current Treatment Recommendations: Strengthening; ROM; Neuromuscular re-education; Manual; Pain management; Home exercise program; Safety education & training; Modalities; Positioning; Dry needling; Patient/Caregiver education & training; Therapeutic activities    Subjective Comments: Patient reports she is feeling very tired today. She says her hand is bothering her a lot more than her shoulder.    Initial Pain Level:   Shoulder 2/10   Post Session Pain Level:   Shoulder 2/10     Medications Last Reviewed: 2025    Updated

## 2025-07-07 ENCOUNTER — HOSPITAL ENCOUNTER (OUTPATIENT)
Dept: PHYSICAL THERAPY | Age: 60
Setting detail: RECURRING SERIES
Discharge: HOME | End: 2025-07-10
Attending: ORTHOPAEDIC SURGERY
Payer: COMMERCIAL

## 2025-07-07 PROCEDURE — 97110 THERAPEUTIC EXERCISES: CPT

## 2025-07-07 ASSESSMENT — PAIN DESCRIPTION - LOCATION: LOCATION: SHOULDER

## 2025-07-07 ASSESSMENT — PAIN SCALES - GENERAL: PAINLEVEL_OUTOF10: 0

## 2025-07-07 NOTE — PROGRESS NOTES
Gracie Alejandro Stanfield  : 1965  Primary: Florence Antoine Sc (Lynnette MUNGUIA)  Secondary:  [facility] on [address]  [place] [address]  Phone: [phone]  Fax: [phone] Plan Frequency: 2x per week    Plan of Care/Certification Expiration Date: 25        Plan of Care/Certification Expiration Date:  Plan of Care/Certification Expiration Date: 25    Frequency/Duration:  Plan Frequency: 2x per week       Time In/Out:   Time In: 1309  Time Out: 1347      PT Visit Info:    Plan Frequency: 2x per week  Total # of Visits Approved: 30  Total # of Visits to Date: 15  Progress Note Counter: 5      Visit Count: 15    OUTPATIENT PHYSICAL THERAPY:  Treatment Note 2025       Charge Capture   Episode (R reverse TSA)               Treatment Diagnosis:   Aftercare following right shoulder joint replacement surgery  Chronic right shoulder pain  Medical/Referring Diagnosis:    Presence of right artificial shoulder joint [Z96.611]  Aftercare following right shoulder joint replacement surgery [Z47.1, Z96.611]  Referring Physician: Mckinley Wheat Jr., MD MD Orders: PT Eval and Treat  Return MD Appt: 25  Date of Onset: 25 (DOS)  Allergies: Bee venom, Sulfa antibiotics, and Aspirin  Restrictions/Precautions:   Post Surgical Precautions: Delta protocol      Interventions Planned: (Treatment may consist of any combination of the following):  Current Treatment Recommendations: Strengthening; ROM; Neuromuscular re-education; Manual; Pain management; Home exercise program; Safety education & training; Modalities; Positioning; Dry needling; Patient/Caregiver education & training; Therapeutic activities    Subjective Comments: Patient arrived a little late to her appointment today. She says she's not having any pain in her shoulder this afternoon upon arrival.    Initial Pain Level:   Shoulder 0/10   Post Session Pain Level:   Shoulder 2/10     Medications Last Reviewed:

## 2025-07-09 ENCOUNTER — HOSPITAL ENCOUNTER (OUTPATIENT)
Dept: PHYSICAL THERAPY | Age: 60
Setting detail: RECURRING SERIES
Discharge: HOME | End: 2025-07-12
Attending: ORTHOPAEDIC SURGERY
Payer: COMMERCIAL

## 2025-07-09 PROCEDURE — 97110 THERAPEUTIC EXERCISES: CPT

## 2025-07-09 ASSESSMENT — PAIN DESCRIPTION - LOCATION: LOCATION: SHOULDER

## 2025-07-09 ASSESSMENT — PAIN SCALES - GENERAL: PAINLEVEL_OUTOF10: 2

## 2025-07-09 NOTE — PROGRESS NOTES
Gracie Alejandro [place]orm[place]  : 1965  Primary: Florence Antoine Sc (Lynnette MUNGUIA)  Secondary:  [facility] on [address]  [place] [address]  Phone: [phone]  Fax: [phone] Plan Frequency: 2x per week    Plan of Care/Certification Expiration Date: 25        Plan of Care/Certification Expiration Date:  Plan of Care/Certification Expiration Date: 25    Frequency/Duration:  Plan Frequency: 2x per week       Time In/Out:   Time In: 1355  Time Out: 1424      PT Visit Info:    Plan Frequency: 2x per week  Total # of Visits Approved: 30  Total # of Visits to Date: 16  Progress Note Counter: 6      Visit Count: 16    OUTPATIENT PHYSICAL THERAPY:  Treatment Note 2025       Charge Capture   Episode (R reverse TSA)               Treatment Diagnosis:   Aftercare following right shoulder joint replacement surgery  Chronic right shoulder pain  Medical/Referring Diagnosis:    Presence of right artificial shoulder joint [Z96.611]  Aftercare following right shoulder joint replacement surgery [Z47.1, Z96.611]  Referring Physician: Mckinley Wheat Jr., MD MD Orders: PT Eval and Treat  Return MD Appt: 25  Date of Onset: 25 (DOS)  Allergies: Bee venom, Sulfa antibiotics, and Aspirin  Restrictions/Precautions:   Post Surgical Precautions: Delta protocol      Interventions Planned: (Treatment may consist of any combination of the following):  Current Treatment Recommendations: Strengthening; ROM; Neuromuscular re-education; Manual; Pain management; Home exercise program; Safety education & training; Modalities; Positioning; Dry needling; Patient/Caregiver education & training; Therapeutic activities    Subjective Comments: Patient arrived a little late to her appointment today due to traffic. She says she's feeling some pain today and is very tired.    Initial Pain Level:   Shoulder 2/10   Post Session Pain Level:   Shoulder 3/10     Medications Last Reviewed:

## 2025-07-14 ENCOUNTER — HOSPITAL ENCOUNTER (OUTPATIENT)
Dept: PHYSICAL THERAPY | Age: 60
Setting detail: RECURRING SERIES
Discharge: HOME | End: 2025-07-17
Attending: ORTHOPAEDIC SURGERY
Payer: COMMERCIAL

## 2025-07-14 PROCEDURE — 97110 THERAPEUTIC EXERCISES: CPT

## 2025-07-14 ASSESSMENT — PAIN DESCRIPTION - LOCATION: LOCATION: SHOULDER

## 2025-07-14 ASSESSMENT — PAIN SCALES - GENERAL: PAINLEVEL_OUTOF10: 0

## 2025-07-14 NOTE — PROGRESS NOTES
Gracie Alejandro [place]  : 1965  Primary: Florence Antoine Sc (Lynnette MUNGUIA)  Secondary:  [facility] on [address]  [place] [address]  Phone: [phone]  Fax: [phone] Plan Frequency: 2x per week    Plan of Care/Certification Expiration Date: 25        Plan of Care/Certification Expiration Date:  Plan of Care/Certification Expiration Date: 25    Frequency/Duration:  Plan Frequency: 2x per week       Time In/Out:   Time In: 1514  Time Out: 1553      PT Visit Info:    Plan Frequency: 2x per week  Total # of Visits Approved: 30  Total # of Visits to Date: 17  Progress Note Counter: 7      Visit Count: 17    OUTPATIENT PHYSICAL THERAPY:  Treatment Note 2025       Charge Capture   Episode (R reverse TSA)               Treatment Diagnosis:   Aftercare following right shoulder joint replacement surgery  Chronic right shoulder pain  Medical/Referring Diagnosis:    Presence of right artificial shoulder joint [Z96.611]  Aftercare following right shoulder joint replacement surgery [Z47.1, Z96.611]  Referring Physician: Mckinley Wheat Jr., MD MD Orders: PT Eval and Treat  Return MD Appt: 25  Date of Onset: 25 (DOS)  Allergies: Bee venom, Sulfa antibiotics, and Aspirin  Restrictions/Precautions:   Post Surgical Precautions: Delta protocol      Interventions Planned: (Treatment may consist of any combination of the following):  Current Treatment Recommendations: Strengthening; ROM; Neuromuscular re-education; Manual; Pain management; Home exercise program; Safety education & training; Modalities; Positioning; Dry needling; Patient/Caregiver education & training; Therapeutic activities    Subjective Comments: Patient says she's not really having any shoulder pain today.    Initial Pain Level:   Shoulder 0/10   Post Session Pain Level:   Shoulder 0/10     Medications Last Reviewed: 2025    Updated Objective Findings: None Today    Treatment

## 2025-07-16 ENCOUNTER — HOSPITAL ENCOUNTER (OUTPATIENT)
Dept: PHYSICAL THERAPY | Age: 60
Setting detail: RECURRING SERIES
Discharge: HOME | End: 2025-07-19
Attending: ORTHOPAEDIC SURGERY
Payer: COMMERCIAL

## 2025-07-16 PROCEDURE — 97110 THERAPEUTIC EXERCISES: CPT

## 2025-07-16 ASSESSMENT — PAIN DESCRIPTION - LOCATION: LOCATION: SHOULDER

## 2025-07-16 ASSESSMENT — PAIN SCALES - GENERAL: PAINLEVEL_OUTOF10: 0

## 2025-07-16 NOTE — PROGRESS NOTES
Gracie Alejandro [place]  : 1965  Primary: Florence Antoine Sc (Lynnette MUNGUIA)  Secondary:  [facility] on [address]  [place] [address]  Phone: [phone]  Fax: [phone] Plan Frequency: 2x per week    Plan of Care/Certification Expiration Date: 25        Plan of Care/Certification Expiration Date:  Plan of Care/Certification Expiration Date: 25    Frequency/Duration:  Plan Frequency: 2x per week       Time In/Out:   Time In: 1340  Time Out: 1418      PT Visit Info:    Plan Frequency: 2x per week  Total # of Visits Approved: 30  Total # of Visits to Date: 18  Progress Note Counter: 8      Visit Count: 18    OUTPATIENT PHYSICAL THERAPY:  Treatment Note 2025       Charge Capture   Episode (R reverse TSA)               Treatment Diagnosis:   Aftercare following right shoulder joint replacement surgery  Chronic right shoulder pain  Medical/Referring Diagnosis:    Presence of right artificial shoulder joint [Z96.611]  Aftercare following right shoulder joint replacement surgery [Z47.1, Z96.611]  Referring Physician: Mckinley Wheat Jr., MD MD Orders: PT Eval and Treat  Return MD Appt: 25  Date of Onset: 25 (DOS)  Allergies: Bee venom, Sulfa antibiotics, and Aspirin  Restrictions/Precautions:   Post Surgical Precautions: Delta protocol      Interventions Planned: (Treatment may consist of any combination of the following):  Current Treatment Recommendations: Strengthening; ROM; Neuromuscular re-education; Manual; Pain management; Home exercise program; Safety education & training; Modalities; Positioning; Dry needling; Patient/Caregiver education & training; Therapeutic activities    Subjective Comments: Patient says she was a little sore after her last visit.    Initial Pain Level:   Shoulder 0/10   Post Session Pain Level:   Shoulder 0/10     Medications Last Reviewed: 2025    Updated Objective Findings: None Today    Treatment     THERAPEUTIC

## 2025-07-21 ENCOUNTER — HOSPITAL ENCOUNTER (OUTPATIENT)
Dept: PHYSICAL THERAPY | Age: 60
Setting detail: RECURRING SERIES
Discharge: HOME | End: 2025-07-24
Attending: ORTHOPAEDIC SURGERY
Payer: COMMERCIAL

## 2025-07-21 PROCEDURE — 97110 THERAPEUTIC EXERCISES: CPT

## 2025-07-21 ASSESSMENT — PAIN SCALES - GENERAL: PAINLEVEL_OUTOF10: 0

## 2025-07-21 ASSESSMENT — PAIN DESCRIPTION - LOCATION: LOCATION: SHOULDER

## 2025-07-21 NOTE — PROGRESS NOTES
Gracie Alejandro [place]  : 1965  Primary: Florence Antoine Sc (Lynnette MUNGUIA)  Secondary:  [facility] on [address]  [place] [address]  Phone: [phone]  Fax: [phone] Plan Frequency: 2x per week    Plan of Care/Certification Expiration Date: 25        Plan of Care/Certification Expiration Date:  Plan of Care/Certification Expiration Date: 25    Frequency/Duration:  Plan Frequency: 2x per week       Time In/Out:   Time In: 1350  Time Out: 1429      PT Visit Info:    Plan Frequency: 2x per week  Total # of Visits Approved: 30  Total # of Visits to Date: 19  Progress Note Counter: 1      Visit Count: 19    OUTPATIENT PHYSICAL THERAPY:  Treatment Note 2025       Charge Capture   Episode (R reverse TSA)               Treatment Diagnosis:   Aftercare following right shoulder joint replacement surgery  Chronic right shoulder pain  Medical/Referring Diagnosis:    Presence of right artificial shoulder joint [Z96.611]  Aftercare following right shoulder joint replacement surgery [Z47.1, Z96.611]  Referring Physician: Mckinley Wheat Jr., MD MD Orders: PT Eval and Treat  Return MD Appt: 25  Date of Onset: 25 (DOS)  Allergies: Bee venom, Sulfa antibiotics, and Aspirin  Restrictions/Precautions:   Post Surgical Precautions: Delta protocol      Interventions Planned: (Treatment may consist of any combination of the following):  Current Treatment Recommendations: Strengthening; ROM; Neuromuscular re-education; Manual; Pain management; Home exercise program; Safety education & training; Modalities; Positioning; Dry needling; Patient/Caregiver education & training; Therapeutic activities    Subjective Comments: Patient says her shoulder is feeling good this afternoon.     Initial Pain Level:   Shoulder 0/10   Post Session Pain Level:   Shoulder 0/10     Medications Last Reviewed: 2025    Updated Objective Findings: None Today    Treatment     THERAPEUTIC 
  PLAN   Effective Dates: 6/25/2025 TO Plan of Care/Certification Expiration Date: 09/23/25     Frequency/Duration: Plan Frequency: 2x per week      Interventions Planned (Treatment may consist of any combination of the following):    Current Treatment Recommendations: Strengthening; ROM; Neuromuscular re-education; Manual; Pain management; Home exercise program; Safety education & training; Modalities; Positioning; Dry needling; Patient/Caregiver education & training; Therapeutic activities    Short-Term Goals: Time Frame: 6 weeks  1. Patient will demonstrate understanding of a home exercise program independently. MET  2. Patient will demonstrate improvement in passive R shoulder flexion to >100 degrees to improve UE function. MET  3. Patient will demonstrate improvement in passive R shoulder ER to >40 degrees to indicate improved ADL performance. MET  4. Patient will demonstrate a 10-point improvement on the DASH in order to show an increase in upper extremity function. MET    Long-Term Goals: Time Frame: 12 weeks    1. Patient will demonstrate improvement in active R shoulder flexion to >135 degrees to increase UE function and participation in ADLs. ONGOING  2. Patient will demonstrate improvement in passive R shoulder ER to >60 degrees to increase UE function and participation in ADLs. ONGOING   3. Patient will demonstrate a 15-point improvement on the DASH in order to show an increase in upper extremity function. MET  4. Patient will demonstrate ability to reach behind her back to perform ADLs such as lower body dressing independently. ONGOING           Medical Necessity:  Patient demonstrates good rehab potential due to prior level of function and is expected to progress in functional mobility.  Skilled intervention is required due to noted deficits affecting participation in basic activities of daily living and overall functional tolerance.     Reason For Services / Other Comments:  Patient continues to

## 2025-07-23 ENCOUNTER — HOSPITAL ENCOUNTER (OUTPATIENT)
Dept: PHYSICAL THERAPY | Age: 60
Setting detail: RECURRING SERIES
Discharge: HOME | End: 2025-07-26
Attending: ORTHOPAEDIC SURGERY
Payer: COMMERCIAL

## 2025-07-23 PROCEDURE — 97110 THERAPEUTIC EXERCISES: CPT

## 2025-07-23 ASSESSMENT — PAIN SCALES - GENERAL: PAINLEVEL_OUTOF10: 0

## 2025-07-23 ASSESSMENT — PAIN DESCRIPTION - LOCATION: LOCATION: SHOULDER

## 2025-07-23 NOTE — PROGRESS NOTES
Gracie Alejandro [place]  : 1965  Primary: Florence Antoine Sc (Lynnette MUNGUIA)  Secondary:  [facility] on [address]  [place] [address]  Phone: [phone]  Fax: [phone] Plan Frequency: 2x per week    Plan of Care/Certification Expiration Date: 25        Plan of Care/Certification Expiration Date:  Plan of Care/Certification Expiration Date: 25    Frequency/Duration:  Plan Frequency: 2x per week       Time In/Out:   Time In: 1345  Time Out: 1426      PT Visit Info:    Plan Frequency: 2x per week  Total # of Visits Approved: 30  Total # of Visits to Date: 20  Progress Note Counter: 2      Visit Count: 20    OUTPATIENT PHYSICAL THERAPY:  Treatment Note 2025       Charge Capture   Episode (R reverse TSA)               Treatment Diagnosis:   Aftercare following right shoulder joint replacement surgery  Chronic right shoulder pain  Medical/Referring Diagnosis:    Presence of right artificial shoulder joint [Z96.611]  Aftercare following right shoulder joint replacement surgery [Z47.1, Z96.611]  Referring Physician: Mckinley Wheat Jr., MD MD Orders: PT Eval and Treat  Return MD Appt: 25  Date of Onset: 25 (DOS)  Allergies: Bee venom, Sulfa antibiotics, and Aspirin  Restrictions/Precautions:   Post Surgical Precautions: Delta protocol      Interventions Planned: (Treatment may consist of any combination of the following):  Current Treatment Recommendations: Strengthening; ROM; Neuromuscular re-education; Manual; Pain management; Home exercise program; Safety education & training; Modalities; Positioning; Dry needling; Patient/Caregiver education & training; Therapeutic activities    Subjective Comments: Patient reports she's not having any pain today. She says she has finally been able to get some rest recently.     Initial Pain Level:   Shoulder 0/10   Post Session Pain Level:   Shoulder 0/10     Medications Last Reviewed: 2025    Updated

## 2025-07-28 ENCOUNTER — HOSPITAL ENCOUNTER (OUTPATIENT)
Dept: PHYSICAL THERAPY | Age: 60
Setting detail: RECURRING SERIES
Discharge: HOME | End: 2025-07-31
Attending: ORTHOPAEDIC SURGERY
Payer: COMMERCIAL

## 2025-07-28 PROCEDURE — 97110 THERAPEUTIC EXERCISES: CPT

## 2025-07-28 ASSESSMENT — PAIN DESCRIPTION - LOCATION: LOCATION: SHOULDER

## 2025-07-28 ASSESSMENT — PAIN SCALES - GENERAL: PAINLEVEL_OUTOF10: 0

## 2025-07-28 NOTE — PROGRESS NOTES
daily - 7 x weekly - 1 sets - 10-15 reps  - Circular Shoulder Pendulum with Table Support  - 3 x daily - 7 x weekly - 30-60 seconds  - Seated Shoulder Flexion AAROM with Pulley Behind  - 2-3 x daily - 7 x weekly - 1-3 minutes  - Sidelying Shoulder Abduction  - 1 x daily - 3 x weekly - 3 sets - 10 reps  - Pulleys - 1-2 x daily - 7 x weekly - 1-3 minutes    Treatment/Session Summary:    Treatment Assessment: Patient tolerated therapy well this afternoon. She struggled with some exercises today due to increased general pain. Her hand limited her today more than her shoulder. She reported feeling some tightness with activity.  Communication/Consultation: None today  Equipment provided: None.   Recommendations / Intent for next treatment session: Next visit will focus on shoulder mobility and muscle activation.    >Total Treatment Billable Duration: 38 minutes   Time In: 1345  Time Out: 1423    Pepe Patten PT    Charge Capture  [facility]  Appt Desk  Attendance Report     Future Appointments   Date Time Provider Department Center   7/30/2025  1:45 PM Pepe Patten, PT [facility]   8/6/2025  2:15 PM Mckinley Wheat Jr., MD [facility]   8/13/2025 10:40 AM Pepe Sorenson DO [facility]   9/24/2025  2:15 PM Gaviota Rodriguez DO [facility]

## 2025-07-30 ENCOUNTER — HOSPITAL ENCOUNTER (OUTPATIENT)
Dept: PHYSICAL THERAPY | Age: 60
Setting detail: RECURRING SERIES
Discharge: HOME | End: 2025-08-02
Attending: ORTHOPAEDIC SURGERY
Payer: COMMERCIAL

## 2025-07-30 PROCEDURE — 97110 THERAPEUTIC EXERCISES: CPT

## 2025-07-30 ASSESSMENT — PAIN DESCRIPTION - LOCATION: LOCATION: SHOULDER

## 2025-07-30 ASSESSMENT — PAIN SCALES - GENERAL: PAINLEVEL_OUTOF10: 0

## 2025-07-30 NOTE — PROGRESS NOTES
Gracie Alejandro [place]  : 1965  Primary: Florence Antoine Sc (Lynnette MUNGUIA)  Secondary:  [facility] on [address]  [place] [address]  Phone: [phone]  Fax: [phone] Plan Frequency: 2x per week    Plan of Care/Certification Expiration Date: 25        Plan of Care/Certification Expiration Date:  Plan of Care/Certification Expiration Date: 25    Frequency/Duration:  Plan Frequency: 2x per week       Time In/Out:   Time In: 1345  Time Out: 1423      PT Visit Info:    Plan Frequency: 2x per week  Total # of Visits Approved: 30  Total # of Visits to Date: 22  Progress Note Counter: 4      Visit Count: 22    OUTPATIENT PHYSICAL THERAPY:  Treatment Note 2025       Charge Capture   Episode (R reverse TSA)               Treatment Diagnosis:   Aftercare following right shoulder joint replacement surgery  Chronic right shoulder pain  Medical/Referring Diagnosis:    Presence of right artificial shoulder joint [Z96.611]  Aftercare following right shoulder joint replacement surgery [Z47.1, Z96.611]  Referring Physician: Mckinley Wheat Jr., MD MD Orders: PT Eval and Treat  Return MD Appt: 25  Date of Onset: 25 (DOS)  Allergies: Bee venom, Sulfa antibiotics, and Aspirin  Restrictions/Precautions:   Post Surgical Precautions: Delta protocol      Interventions Planned: (Treatment may consist of any combination of the following):  Current Treatment Recommendations: Strengthening; ROM; Neuromuscular re-education; Manual; Pain management; Home exercise program; Safety education & training; Modalities; Positioning; Dry needling; Patient/Caregiver education & training; Therapeutic activities    Subjective Comments: Patient reports she's not having any shoulder pain today.    Initial Pain Level:   Shoulder 0/10   Post Session Pain Level:   Shoulder 0/10     Medications Last Reviewed: 2025    Updated Objective Findings: None Today    Treatment     THERAPEUTIC

## 2025-08-04 ENCOUNTER — HOSPITAL ENCOUNTER (OUTPATIENT)
Dept: PHYSICAL THERAPY | Age: 60
Setting detail: RECURRING SERIES
Discharge: HOME | End: 2025-08-07
Attending: ORTHOPAEDIC SURGERY
Payer: COMMERCIAL

## 2025-08-04 PROCEDURE — 97110 THERAPEUTIC EXERCISES: CPT

## 2025-08-04 ASSESSMENT — PAIN DESCRIPTION - LOCATION: LOCATION: SHOULDER

## 2025-08-04 ASSESSMENT — PAIN SCALES - GENERAL: PAINLEVEL_OUTOF10: 1

## 2025-08-06 ENCOUNTER — OFFICE VISIT (OUTPATIENT)
Dept: ORTHOPEDIC SURGERY | Age: 60
End: 2025-08-06
Payer: COMMERCIAL

## 2025-08-06 ENCOUNTER — HOSPITAL ENCOUNTER (OUTPATIENT)
Dept: PHYSICAL THERAPY | Age: 60
Setting detail: RECURRING SERIES
Discharge: HOME | End: 2025-08-09
Attending: ORTHOPAEDIC SURGERY
Payer: COMMERCIAL

## 2025-08-06 DIAGNOSIS — Z96.611 PRESENCE OF RIGHT ARTIFICIAL SHOULDER JOINT: ICD-10-CM

## 2025-08-06 DIAGNOSIS — M25.50 ARTHRALGIA, UNSPECIFIED JOINT: ICD-10-CM

## 2025-08-06 DIAGNOSIS — M79.641 HAND PAIN, RIGHT: Primary | ICD-10-CM

## 2025-08-06 PROCEDURE — 99212 OFFICE O/P EST SF 10 MIN: CPT | Performed by: ORTHOPAEDIC SURGERY

## 2025-08-06 PROCEDURE — 97110 THERAPEUTIC EXERCISES: CPT

## 2025-08-06 ASSESSMENT — PAIN DESCRIPTION - LOCATION: LOCATION: SHOULDER

## 2025-08-06 ASSESSMENT — PAIN SCALES - GENERAL: PAINLEVEL_OUTOF10: 0

## 2025-08-11 ENCOUNTER — HOSPITAL ENCOUNTER (OUTPATIENT)
Dept: PHYSICAL THERAPY | Age: 60
Setting detail: RECURRING SERIES
Discharge: HOME | End: 2025-08-14
Attending: ORTHOPAEDIC SURGERY
Payer: COMMERCIAL

## 2025-08-11 PROCEDURE — 97110 THERAPEUTIC EXERCISES: CPT

## 2025-08-11 ASSESSMENT — PAIN DESCRIPTION - LOCATION: LOCATION: SHOULDER

## 2025-08-11 ASSESSMENT — PAIN SCALES - GENERAL: PAINLEVEL_OUTOF10: 0

## 2025-08-13 ENCOUNTER — OFFICE VISIT (OUTPATIENT)
Dept: INTERNAL MEDICINE CLINIC | Facility: CLINIC | Age: 60
End: 2025-08-13
Payer: COMMERCIAL

## 2025-08-13 ENCOUNTER — HOSPITAL ENCOUNTER (OUTPATIENT)
Dept: PHYSICAL THERAPY | Age: 60
Setting detail: RECURRING SERIES
Discharge: HOME | End: 2025-08-16
Attending: ORTHOPAEDIC SURGERY
Payer: COMMERCIAL

## 2025-08-13 VITALS
OXYGEN SATURATION: 99 % | DIASTOLIC BLOOD PRESSURE: 82 MMHG | HEART RATE: 76 BPM | BODY MASS INDEX: 18.61 KG/M2 | SYSTOLIC BLOOD PRESSURE: 128 MMHG | HEIGHT: 63 IN | WEIGHT: 105 LBS

## 2025-08-13 DIAGNOSIS — G56.01 CARPAL TUNNEL SYNDROME OF RIGHT WRIST: ICD-10-CM

## 2025-08-13 DIAGNOSIS — M75.21 BICIPITAL TENDINITIS OF RIGHT SHOULDER: ICD-10-CM

## 2025-08-13 DIAGNOSIS — G56.90 AXILLARY NEUROPATHY: ICD-10-CM

## 2025-08-13 DIAGNOSIS — G56.21 CUBITAL TUNNEL SYNDROME ON RIGHT: ICD-10-CM

## 2025-08-13 DIAGNOSIS — M75.101 ROTATOR CUFF TEAR ARTHROPATHY OF RIGHT SHOULDER: ICD-10-CM

## 2025-08-13 DIAGNOSIS — E55.9 VITAMIN D DEFICIENCY: ICD-10-CM

## 2025-08-13 DIAGNOSIS — G47.8 NON-RESTORATIVE SLEEP: ICD-10-CM

## 2025-08-13 DIAGNOSIS — R91.1 PULMONARY NODULE: ICD-10-CM

## 2025-08-13 DIAGNOSIS — M12.811 ROTATOR CUFF TEAR ARTHROPATHY OF RIGHT SHOULDER: ICD-10-CM

## 2025-08-13 DIAGNOSIS — E78.5 DYSLIPIDEMIA: Primary | ICD-10-CM

## 2025-08-13 DIAGNOSIS — K21.9 GASTROESOPHAGEAL REFLUX DISEASE, UNSPECIFIED WHETHER ESOPHAGITIS PRESENT: ICD-10-CM

## 2025-08-13 DIAGNOSIS — G47.33 OSA (OBSTRUCTIVE SLEEP APNEA): ICD-10-CM

## 2025-08-13 DIAGNOSIS — M19.011 DEGENERATIVE JOINT DISEASE OF RIGHT ACROMIOCLAVICULAR JOINT: ICD-10-CM

## 2025-08-13 DIAGNOSIS — F17.210 CIGARETTE NICOTINE DEPENDENCE WITHOUT COMPLICATION: ICD-10-CM

## 2025-08-13 PROCEDURE — 99214 OFFICE O/P EST MOD 30 MIN: CPT | Performed by: INTERNAL MEDICINE

## 2025-08-13 PROCEDURE — G2211 COMPLEX E/M VISIT ADD ON: HCPCS | Performed by: INTERNAL MEDICINE

## 2025-08-13 PROCEDURE — 97110 THERAPEUTIC EXERCISES: CPT

## 2025-08-13 RX ORDER — MELOXICAM 15 MG/1
15 TABLET ORAL DAILY
Qty: 90 TABLET | Refills: 1 | Status: SHIPPED | OUTPATIENT
Start: 2025-08-13 | End: 2026-02-09

## 2025-08-13 RX ORDER — ROSUVASTATIN CALCIUM 20 MG/1
20 TABLET, COATED ORAL DAILY
Qty: 90 TABLET | Refills: 1 | Status: SHIPPED | OUTPATIENT
Start: 2025-08-13

## 2025-08-13 ASSESSMENT — PAIN SCALES - GENERAL: PAINLEVEL_OUTOF10: 0

## 2025-08-13 ASSESSMENT — PAIN DESCRIPTION - LOCATION: LOCATION: SHOULDER

## 2025-08-14 ENCOUNTER — OFFICE VISIT (OUTPATIENT)
Age: 60
End: 2025-08-14

## 2025-08-14 DIAGNOSIS — R20.2 PARESTHESIAS IN LEFT HAND: ICD-10-CM

## 2025-08-14 DIAGNOSIS — M79.642 BILATERAL HAND PAIN: Primary | ICD-10-CM

## 2025-08-14 DIAGNOSIS — M79.641 BILATERAL HAND PAIN: Primary | ICD-10-CM

## 2025-08-14 DIAGNOSIS — M21.839: ICD-10-CM

## 2025-08-18 ENCOUNTER — HOSPITAL ENCOUNTER (OUTPATIENT)
Dept: PHYSICAL THERAPY | Age: 60
Setting detail: RECURRING SERIES
Discharge: HOME | End: 2025-08-21
Attending: ORTHOPAEDIC SURGERY
Payer: COMMERCIAL

## 2025-08-18 PROCEDURE — 97110 THERAPEUTIC EXERCISES: CPT

## 2025-08-18 ASSESSMENT — PAIN SCALES - GENERAL: PAINLEVEL_OUTOF10: 0

## 2025-08-18 ASSESSMENT — PAIN DESCRIPTION - LOCATION: LOCATION: SHOULDER

## 2025-08-20 ENCOUNTER — APPOINTMENT (OUTPATIENT)
Dept: PHYSICAL THERAPY | Age: 60
End: 2025-08-20
Attending: ORTHOPAEDIC SURGERY
Payer: COMMERCIAL

## 2025-08-22 ENCOUNTER — EVALUATION (OUTPATIENT)
Age: 60
End: 2025-08-22

## 2025-08-22 DIAGNOSIS — M25.60 DECREASED RANGE OF MOTION: Primary | ICD-10-CM

## 2025-08-27 ENCOUNTER — HOSPITAL ENCOUNTER (OUTPATIENT)
Dept: PHYSICAL THERAPY | Age: 60
Setting detail: RECURRING SERIES
Discharge: HOME | End: 2025-08-30
Attending: ORTHOPAEDIC SURGERY
Payer: COMMERCIAL

## 2025-08-27 PROCEDURE — 97110 THERAPEUTIC EXERCISES: CPT

## 2025-08-27 ASSESSMENT — PAIN DESCRIPTION - LOCATION: LOCATION: SHOULDER

## 2025-08-27 ASSESSMENT — PAIN SCALES - GENERAL: PAINLEVEL_OUTOF10: 3

## 2025-09-02 ENCOUNTER — HOSPITAL ENCOUNTER (OUTPATIENT)
Dept: PHYSICAL THERAPY | Age: 60
Setting detail: RECURRING SERIES
Discharge: HOME | End: 2025-09-05
Attending: ORTHOPAEDIC SURGERY
Payer: COMMERCIAL

## 2025-09-02 PROCEDURE — 97110 THERAPEUTIC EXERCISES: CPT

## 2025-09-02 ASSESSMENT — PAIN DESCRIPTION - LOCATION: LOCATION: SHOULDER

## 2025-09-02 ASSESSMENT — PAIN SCALES - GENERAL: PAINLEVEL_OUTOF10: 3

## (undated) DEVICE — PENCIL ES L3M BTTN SWCH HOLSTER W/ BLDE ELECTRD EDGE

## (undated) DEVICE — COVER,TABLE,HEAVY DUTY,79"X110",STRL: Brand: MEDLINE

## (undated) DEVICE — SOLUTION IRRIG 3000ML 0.9% SOD CHL USP UROMATIC PLAS CONT

## (undated) DEVICE — JACKET VEST SURG SMS ST

## (undated) DEVICE — TOWEL,OR,DSP,ST,NATURAL,DLX,4/PK,20PK/CS: Brand: MEDLINE

## (undated) DEVICE — ELECTRODE NDL 2.8IN COAT VALLEYLAB

## (undated) DEVICE — OSCILLATING TIP SAW CARTRIDGE: Brand: STRYKER PRECISION

## (undated) DEVICE — GLOVE SURG SZ 6 THK91MIL LTX FREE SYN POLYISOPRENE ANTI

## (undated) DEVICE — GLOVE SURG SZ 9 THK91MIL LTX FREE SYN POLYISOPRENE ANTI

## (undated) DEVICE — DRAPE,TOP,102X53,STERILE: Brand: MEDLINE

## (undated) DEVICE — HANDPIECE SET WITH COAXIAL HIGH FLOW TIP AND SUCTION TUBE: Brand: INTERPULSE

## (undated) DEVICE — SUTURE FIBERWIRE SZ 2 W/ TAPERED NEEDLE BLUE L38IN NONABSORB BLU L26.5MM 1/2 CIRCLE AR7200

## (undated) DEVICE — STRIP,CLOSURE,WOUND,MEDI-STRIP,1/2X4: Brand: MEDLINE

## (undated) DEVICE — SLING & SWATHE: Brand: DEROYAL

## (undated) DEVICE — PAD,ABDOMINAL,5"X9",ST,LF,25/BX: Brand: MEDLINE INDUSTRIES, INC.

## (undated) DEVICE — TOTAL SHOULDER PACK: Brand: MEDLINE INDUSTRIES, INC.

## (undated) DEVICE — SPONGE LAP W18XL18IN WHT COT 4 PLY FLD STRUNG RADPQ DISP ST 2 PER PACK

## (undated) DEVICE — 3M™ STERI-DRAPE™ INSTRUMENT POUCH 1018: Brand: STERI-DRAPE™

## (undated) DEVICE — STOCKINETTE,DOUBLE PLY,6X48,STERILE: Brand: MEDLINE

## (undated) DEVICE — GLOVE SURG SZ 6 L12IN FNGR THK79MIL GRN LTX FREE

## (undated) DEVICE — BANDAGE,GAUZE,BULKEE II,4.5"X4.1YD,STRL: Brand: MEDLINE

## (undated) DEVICE — DRAPE TBL W72XH34IN D30IN SGL PC DISPOSABLE

## (undated) DEVICE — FLORASEAL MICROBIAL SEALANT: Brand: FLORASEAL

## (undated) DEVICE — SPONGE GZ W4XL4IN COT 12 PLY TYP VII WVN C FLD DSGN STERILE

## (undated) DEVICE — SUTURE FIBERWIRE SZ 5 L38IN NONABSORBABLE BLU L48MM 1/2 AR7211

## (undated) DEVICE — DRAPE,U/SHT,SPLIT,FILM,60X84,STERILE: Brand: MEDLINE

## (undated) DEVICE — ARGYLE SIGMOID SURGICAL SUCTION INSTRUMENT 18 FR/CH (6.0 MM): Brand: ARGYLE